# Patient Record
Sex: MALE | Race: WHITE | NOT HISPANIC OR LATINO | Employment: OTHER | URBAN - METROPOLITAN AREA
[De-identification: names, ages, dates, MRNs, and addresses within clinical notes are randomized per-mention and may not be internally consistent; named-entity substitution may affect disease eponyms.]

---

## 2017-04-12 ENCOUNTER — LAB VISIT (OUTPATIENT)
Dept: LAB | Facility: HOSPITAL | Age: 72
End: 2017-04-12
Attending: UROLOGY
Payer: MEDICARE

## 2017-04-12 ENCOUNTER — OFFICE VISIT (OUTPATIENT)
Dept: UROLOGY | Facility: CLINIC | Age: 72
End: 2017-04-12
Payer: MEDICARE

## 2017-04-12 VITALS — WEIGHT: 172 LBS | DIASTOLIC BLOOD PRESSURE: 74 MMHG | SYSTOLIC BLOOD PRESSURE: 134 MMHG | BODY MASS INDEX: 26.94 KG/M2

## 2017-04-12 DIAGNOSIS — Z12.5 PROSTATE CANCER SCREENING: Primary | ICD-10-CM

## 2017-04-12 DIAGNOSIS — Z12.5 PROSTATE CANCER SCREENING: ICD-10-CM

## 2017-04-12 LAB
BILIRUB SERPL-MCNC: NORMAL MG/DL
BLOOD URINE, POC: NORMAL
COLOR, POC UA: NORMAL
COMPLEXED PSA SERPL-MCNC: 9.3 NG/ML
GLUCOSE UR QL STRIP: NORMAL
KETONES UR QL STRIP: NORMAL
LEUKOCYTE ESTERASE URINE, POC: NORMAL
NITRITE, POC UA: NORMAL
PH, POC UA: 6
PROTEIN, POC: NORMAL
SPECIFIC GRAVITY, POC UA: 1.01
UROBILINOGEN, POC UA: NORMAL

## 2017-04-12 PROCEDURE — 84153 ASSAY OF PSA TOTAL: CPT

## 2017-04-12 PROCEDURE — 99214 OFFICE O/P EST MOD 30 MIN: CPT | Mod: S$PBB,,, | Performed by: UROLOGY

## 2017-04-12 PROCEDURE — 36415 COLL VENOUS BLD VENIPUNCTURE: CPT

## 2017-04-12 PROCEDURE — 99999 PR PBB SHADOW E&M-EST. PATIENT-LVL II: CPT | Mod: PBBFAC,,, | Performed by: UROLOGY

## 2017-04-12 NOTE — MR AVS SNAPSHOT
[facility]  [address]  [place] [address]  Phone: [phone]  Fax: [phone]                  Seamus Benitez   2017 8:00 AM   Office Visit    Description:  Male : 1945   Provider:  Renan Antony IV, MD   [facility]  [facility]           Diagnoses this Visit        Comments    Prostate cancer screening    -  Primary            To Do List           Future Appointments        Provider Department Dept Phone    2017 9:00 AM LAB, SAME DAY [facility] [phone]    2018 9:30 AM LABORATORY, [facility] [facility] [phone]    2018 9:00 AM Renan Antony IV, MD [facility] [phone]      Goals (5 Years of Data)     None      Follow-Up and Disposition     Return in about 1 year (around 2018).    Follow-up and Disposition History      [facility]     [facility] -  Assistance  Unless otherwise directed by your provider, please contact [facility], our nurse care line that is available for  assistance.     Registered nurses in the [facility] provide: appointment scheduling, clinical advisement, health education, and other advisory services.  Call: [phone] (toll free)               Medications                Verify that the below list of medications is an accurate representation of the medications you are currently taking.  If none reported, the list may be blank. If incorrect, please contact your healthcare provider. Carry this list with you in case of emergency.                Clinical Reference Information           Your Vitals Were     BP Weight BMI          134/74 (BP Location: Left arm, Patient Position: Sitting, BP Method: Automatic) 78 kg (172 lb) 26.94 kg/m2        Blood Pressure          Most Recent Value    BP  134/74      Allergies as of 2017     No Known Allergies      Immunizations Administered on Date of Encounter  - 4/12/2017     None      Orders Placed During Today's Visit      Normal Orders This Visit    POCT urine dipstick without microscope     Future Labs/Procedures Expected by Expires    PSA, Screening  4/12/2018 6/11/2018    PSA, Screening  4/12/2018 6/11/2018 4/12/2017  8:32 AM - Venice Orozco LPN      Component Results     Component    Color, UA    yellow, clear    Spec Grav UA    1.015    pH, UA    6    WBC, UA    neg    Nitrite, UA    neg    Protein    neg    Glucose, UA    neg    Ketones, UA    neg    Urobilinogen, UA    neg    Bilirubin    neg    Blood, UA    neg            Language Assistance Services     ATTENTION: Language assistance services are available, free of charge. Please call [phone].      ATENCIÓN: Si habla anushka, tiene a mendez disposición servicios gratuitos de asistencia lingüística. Llame al [phone].     CHÚ Ý: N?u b?n nói Ti?ng Vi?t, có các d?ch v? h? tr? ngôn ng? mi?n phí dành cho b?n. G?i s? [phone].         [facility] complies with applicable Federal civil rights laws and does not discriminate on the basis of race, color, national origin, age, disability, or sex.

## 2017-04-12 NOTE — PROGRESS NOTES
Chief Complaint: Elevated PSA    HPI:   4/12/17: No problems at all.  4/11/16: No PSA done will order today.  Doing well.  Says that for the last four months when he drinks a bunch of beer he gets bilateral flank pain. No hematuria.  3/4/15: PSA essentially unchanged.  Oxybutinin and caffeine cessation have improved OAB but not even needing the oxybutinin lately.  10/10/14: Pt states that after a couple of beers his testicles start to get a dull pain and he also has some SP pressure before voiding.  Then he voids and it is relieved.  Fine during the day.  Really a problem when he drinks too much.  Good stream.  Doesn't hold too long.  Drinks coffee in AM, coke or two during the day, VO/coke,   9/3/14: Pt doing well from cyst excision.  PSA 8.4 on recheck after 14d cipro for PSA 10.  Left testicle the same.  7/9/14: 69 yo man has a dull left testicular pain when he goes to bed but not during most of the day.  Keeps him from sleeping a little bit.  No hematuria.  No urolithiasis history.  Saw Dr. Haskins and another urologist for BPH and elevated PSA.  Had two negative prostate biopsies last 4 years ago and he was advised to have a third and he declined.  No urinary bother.  No constipation.  No hernias or related surgeries.    Allergies:  Review of patient's allergies indicates no known allergies.    Medications:  currently has no medications in their medication list.    Review of Systems:  General: No fever, chills, fatigability, or weight loss.  Skin: No rashes, itching, or changes in color or texture of skin.  Chest: Denies AQUINO, cyanosis, wheezing, cough, and sputum production.  Abdomen: Appetite fine. No weight loss. Denies diarrhea, abdominal pain, hematemesis, or blood in stool.  Musculoskeletal: No joint stiffness or swelling. Some back pain.  : As above.  All other review of systems negative.    PMH:   has a past medical history of Elevated PSA.    PSH:   has a past surgical history that includes Appendectomy  and left foot surgery.    FamHx: family history includes Cancer in his father.    SocHx:  reports that he has quit smoking. He does not have any smokeless tobacco history on file. He reports that he drinks alcohol. He reports that he does not use illicit drugs.      Physical Exam:  Vitals:    04/12/17 0814   BP: 134/74     General: A&Ox3, no apparent distress, no deformities  Neck: No masses, normal thyroid  Lungs: normal inspiration, no use of accessory muscles  Heart: normal pulse, no arrhythmias  Abdomen: Soft, NT, ND  Skin: The skin is warm and dry. No jaundice.  Ext: No c/c/e.  :   4/12/17: Test desc priya, no abnormalities of epididymus, nontender. Penis normal, with normal penile and scrotal skin. Meatus normal. Normal rectal tone, no hemorrhoids. Prost >50 gm no nodules or masses appreciated. SV not palpable. Perineum and anus normal.      Labs/Studies:   PSA    7/29/14: 8.4    3/3/15: 9.2    4/16: 10.2  Bladder Scan performed in office:     10/14: PVR 30 ml.    Impression/Plan:   1. Doing really well, PSA now and in 12 mo with RTC 12 mo.

## 2018-02-27 ENCOUNTER — OFFICE VISIT (OUTPATIENT)
Dept: INTERNAL MEDICINE | Facility: CLINIC | Age: 73
End: 2018-02-27
Payer: MEDICARE

## 2018-02-27 VITALS
WEIGHT: 178.81 LBS | BODY MASS INDEX: 28.07 KG/M2 | DIASTOLIC BLOOD PRESSURE: 76 MMHG | TEMPERATURE: 98 F | SYSTOLIC BLOOD PRESSURE: 132 MMHG | OXYGEN SATURATION: 98 % | HEIGHT: 67 IN | HEART RATE: 70 BPM

## 2018-02-27 DIAGNOSIS — L98.9 SKIN LESION: Primary | ICD-10-CM

## 2018-02-27 PROCEDURE — 1159F MED LIST DOCD IN RCRD: CPT | Mod: S$GLB,,, | Performed by: FAMILY MEDICINE

## 2018-02-27 PROCEDURE — 3008F BODY MASS INDEX DOCD: CPT | Mod: S$GLB,,, | Performed by: FAMILY MEDICINE

## 2018-02-27 PROCEDURE — 99202 OFFICE O/P NEW SF 15 MIN: CPT | Mod: S$GLB,,, | Performed by: FAMILY MEDICINE

## 2018-02-27 PROCEDURE — 99999 PR PBB SHADOW E&M-EST. PATIENT-LVL IV: CPT | Mod: PBBFAC,,, | Performed by: FAMILY MEDICINE

## 2018-02-27 PROCEDURE — 1126F AMNT PAIN NOTED NONE PRSNT: CPT | Mod: S$GLB,,, | Performed by: FAMILY MEDICINE

## 2018-02-27 RX ORDER — HALOBETASOL PROPIONATE 0.5 MG/G
CREAM TOPICAL
COMMUNITY
Start: 2018-02-01 | End: 2019-01-02

## 2018-02-27 NOTE — PROGRESS NOTES
"HEALTH MAINTENANCE REVIEW  Health Maintenance   Topic Date Due    Hepatitis C Screening  1945    Lipid Panel  1945    TETANUS VACCINE  07/11/1964    Colonoscopy  07/11/1996    Zoster Vaccine  07/11/2006    Pneumococcal (65+) (1 of 2 - PCV13) 07/11/2011    Abdominal Aortic Aneurysm Screening  07/11/2011    Influenza Vaccine  08/01/2017        HEALTH MAINTENANCE INTERVENTIONS - DUE OR DUE SOON  Health Maintenance Due   Topic Date Due    Hepatitis C Screening  1945    Lipid Panel  1945    TETANUS VACCINE  07/11/1964    Colonoscopy  07/11/1996    Zoster Vaccine  07/11/2006    Pneumococcal (65+) (1 of 2 - PCV13) 07/11/2011    Abdominal Aortic Aneurysm Screening  07/11/2011    Influenza Vaccine  08/01/2017       FUTURE APPOINTMENTS  Future Appointments  Date Time Provider Department Center   4/12/2018 9:30 AM LABORATORY, [facility]   4/18/2018 9:00 AM Renan Antony IV, MD ON UROLOGY  Medical    5/10/2018 8:30 AM Hiral James MD [facility] [place]       CHIEF COMPLAINT  Recurrent Skin Infections      HISTORY OF PRESENT ILLNESS    Problem List Items Addressed This Visit     Skin lesion - Primary    Overview     ??bullous pemphigus??         Current Assessment & Plan     This problem is NEW TO ME. This problem REQUIRES FURTHER WORK-UP. ONSET was 3 to 4 weeks ago. QUALITY described as painful tender area. SEVERITY described as MODERATELY SEVERE at worst, MILD at present. EXACERBATING FACTORS included sitting on the area. LOCATION is medial aspect of right buttock. TIMING of symptoms described as having gotten much better. He initially described this area to me as a "boil." However, on exam, it does not appear consistent with a carbuncle/your uncle. At the area in question (the medial aspect of the right buttock), there is a roughly 1 x 1.5 area of MILDLY erythematous tissue with MINIMAL (if any) induration. There is no hyperthermia, fluctuance, drainage, or " "tenderness. He went on to provide additional history that he has "eczema", treated with halobetasol cream, prescribed by dermatologist, and these lesions to occur within areas of the eczema. Their description of the lesions is atypical for boils, but doesn't sound exactly like bullous lesions, either. I discussed differential diagnosis with him. I explained that I was uncertain of the cause of this lesion. It was agreed to treat it empirically with the prescription halobetasol 0.05% cream while awaiting dermatology consultation for second opinion. In the meantime, I instructed him to take photos of any new lesions that develop.          Relevant Orders    Ambulatory referral to Dermatology          REVIEW OF SYSTEMS  CONSTITUTIONAL: No fever or chills reported.   CARDIOVASCULAR: No angina or orthopnea reported.   PULMONARY: No hemoptysis or trouble breathing reported.   HEMATOLOGIC: No bleeding problems reported.     PHYSICAL EXAM  Vitals:    02/27/18 0938   BP: 132/76   BP Location: Right arm   Patient Position: Sitting   BP Method: Medium (Manual)   Pulse: 70   Temp: 97.7 °F (36.5 °C)   TempSrc: Tympanic   SpO2: 98%   Weight: 81.1 kg (178 lb 12.7 oz)   Height: 5' 7" (1.702 m)     CONSTITUTIONAL: Vital signs noted. No apparent distress. Does not appear acutely ill or septic. Appears adequately hydrated.  HEENT: External ENT grossly unremarkable. Hearing grossly intact. Oropharynx moist.  PULM: Lungs clear. Breathing unlabored.  HEART: Auscultation reveals regular rate and rhythm without murmur, gallop or rub.  DERM: Skin warm and moist with normal turgor. Description of exam of this system is further documented above in HPI.   NEURO: There are no gross focal motor deficits or gross deficits of cranial nerves III-XII.  PSYCHIATRIC: Alert and oriented x 3. Mood is grossly neutral. Affect appropriate. Judgment and insight not grossly compromised.  MUSCULOSKELETAL: Grossly normal stance and gait.     PAST " "MEDICAL HISTORY, FAMILY HISTORY, SOCIAL HISTORY, CURRENT MEDICATION LIST, and ALLERGY LIST reviewed by me (JOAN Mathews MD) and are updated consistent with the patient's report.    ASSESSMENT and PLAN  Skin lesion  -     Ambulatory referral to Dermatology        PRESCRIPTION MEDICATION MANAGEMENT  Medication List with Changes/Refills   Current Medications    HALOBETASOL (ULTRAVATE) 0.05 % CREAM           Follow-up for any worsening or failure to improve, any new complaints or concerns, wellness and preventive services.    ABOUT THIS DOCUMENTATION:  · The order of the conditions listed in the HPI is one of convenience and does not necessarily reflect the chronology of the appointment, nor the relative importance of a condition. It is possible that additional description or status details about condition(s) may be found elsewhere in the EHR documentation for today's encounter.  · Documentation entered by me for this encounter was done in part using speech-recognition technology. Although I have made an effort to ensure accuracy, "sound like" errors may exist and should be interpreted in context.                        -JOAN Mathews MD    Patient Instructions     I'm NOT telling you that you for sure have bullous pemphigoid, but it is what I THINK you have. Your dermatologist will be able to tell you more.      Understanding Bullous Pemphigoid    Bullous pemphigoid is a long-term (chronic) skin disease. It mostly affects people ages 60 and older. It causes large blisters to form. These blisters may be on one part of the body or all over. They often appear on the arms, legs, groin, chest, and stomach. Sores may sometimes occur in the mouth, too.  How to say it  BULL-us [id number]   What causes bullous pemphigoid?  Bullous pemphigoid is an autoimmune disease. Thats when the immune system attacks healthy parts of the body. In this case, it attacks the proteins that join the skin cells to one another. The " disease may also be caused by some medicines, such as penicillin.  Symptoms of bullous pemphigoid  Bullous pemphigoid may first look like hives or eczema, because it can be red and itchy. Large blisters may then form. These blisters are often filled with a clear liquid. They may feel very itchy. The blisters may pop. But they usually dont leave any scars. They may go away and come back again.  Treatment for bullous pemphigoid  The blisters from this skin disease may go away and come back. So treatment is often needed for a few years. Treatment options include:  · Skin care. Using mild soap and anti-itch creams may help with symptoms.  · Steroids. Steroids are the main treatment option for this disease. They may be creams or ointments that you put on your skin. Or they may be in pill or liquid form (oral). They can ease itching and stop new blisters from forming. But oral steroids may have side effects.  · Other medicines. If you cant take a steroid, other medicines may work. Some of these treat the disease by slowing down the immune system.  Possible complications of bullous pemphigoid  ·  Skin infection  When to call your healthcare provider  Call your healthcare provider right away if you have any of these:  · Fever of 100.4°F (38°C) or higher, or as directed  · Pain that gets worse  · Symptoms that dont get better, or get worse  · New symptoms   Date Last Reviewed: 5/1/2016  © 8434-3059 [employer]. [address] [place] [address], [place] [address]. All rights reserved. This information is not intended as a substitute for professional medical care. Always follow your healthcare professional's instructions.    Prevention Guidelines, Men Ages 65 and Older  Screening tests and vaccines are an important part of managing your health. Health counseling is essential, too. Below are guidelines for these, for men ages 65 and older. Talk with your healthcare provider to make sure youre up-to-date on what you  need.  Screening Who needs it How often   Abdominal aortic aneurysm Men ages 65 to 75 who have ever smoked 1 ultrasound   Alcohol misuse All men in this age group At routine exams   Blood pressure All men in this age group Every 2 years if your blood pressure is less than 120/80 mm Hg; yearly if your systolic blood pressure is 120 to 139 mm Hg, or your diastolic blood pressure reading is 80 to 89 mm Hg   Colorectal cancer All men in this age group Flexible sigmoidoscopy every 5 years, or colonoscopy every 10 years, or double-contrast barium enema every 5 years; yearly fecal occult blood test or fecal immunochemical test; or a stool DNA test as often as your healthcare provider advises; talk with your healthcare provider about which tests are best for you and when you no longer need colonoscopies (generally after age 75)   Depression All men in this age group At routine exams   Type 2 diabetes or prediabetes All adults beginning at age 45 and adults without symptoms at any age who are overweight or obese and have 1 or more other risk factors for diabetes At least every 3 years (yearly if your blood sugar has already begun to rise)   Hepatitis C Men at increased risk for infection - talk with your healthcare provider At routine exams   High cholesterol or triglycerides All men in this age group At least every 5 years   HIV Men at increased risk for infection - talk with your healthcare provider At routine exams   Lung cancer Adults ages 55 to 80 who have smoked Yearly screening in smokers with 30 pack-year history of smoking or who quit within 15 years   Obesity All men in this age group At routine exams   Prostate cancer All men in this age group, talk to healthcare provider about risks and benefits of digital rectal exam (EDUARDO) and prostate-specific antigen (PSA) screening1 At routine exams   Syphilis Men at increased risk for infection - talk with your healthcare provider At routine exams   Tuberculosis Men at  increased risk for infection - talk with your healthcare provider Ask your healthcare provider   Vision All men in this age group Every 1 to 2 years; if you have a chronic health condition, ask your healthcare provider if you needs exams more often   Vaccine Who needs it How often   Chickenpox (varicella) All men in this age group who have no record of this infection or vaccine 2 doses; second dose should be given at least 4 weeks after the first dose   Hepatitis A Men at increased risk for infection - talk with your healthcare provider 2 doses given at least 6 months apart   Hepatitis B Men at increased risk for infection - talk with your healthcare provider 3 doses over 6 months; second dose should be given 1 month after the first dose; the third dose should be given at least 2 months after the second dose and at least 4 months after the first dose   Haemophilus influenzae Type B (HIB) Men at increased risk for infection - talk with your healthcare provider 1 to 3 doses   Influenza (flu) All men in this age group  Once a year   Meningococcal Men at increased risk for infection - talk with your healthcare provider 1 or more doses   Pneumococcal conjugate vaccine (PCV13) and pneumococcal polysaccharide vaccine (PPSV23) All men in this age group 1 dose of each vaccine   Tetanus/diphtheria/  pertussis (Td/Tdap) booster All men in this age group Td every 10 years, or Tdap if you will have contact with a child younger than 12 months old   Zoster All men in this age group 1 dose   Counseling Who needs it How often   Diet and exercise Men who are overweight or obese When diagnosed, and then at routine exams   Fall prevention (exercise, vitamin D supplements) All men in this age group At routine exams   Sexually transmitted infection Men at increased risk for infection - talk with your healthcare provider At routine exams   Use of daily aspirin Men ages 45 to 79 at risk for cardiovascular health problems At routine exams    Use of tobacco and the health effects it can cause All men in this age group Every visit   [address] [place] [address] [facility]   Date Last Reviewed: 2/1/2017  © 5204-2153 [employer]. [address] [place] [address], [place] [address]. All rights reserved. This information is not intended as a substitute for professional medical care. Always follow your healthcare professional's instructions.

## 2018-02-27 NOTE — ASSESSMENT & PLAN NOTE
"This problem is NEW TO ME. This problem REQUIRES FURTHER WORK-UP. ONSET was 3 to 4 weeks ago. QUALITY described as painful tender area. SEVERITY described as MODERATELY SEVERE at worst, MILD at present. EXACERBATING FACTORS included sitting on the area. LOCATION is medial aspect of right buttock. TIMING of symptoms described as having gotten much better. He initially described this area to me as a "boil." However, on exam, it does not appear consistent with a carbuncle/your uncle. At the area in question (the medial aspect of the right buttock), there is a roughly 1 x 1.5 area of MILDLY erythematous tissue with MINIMAL (if any) induration. There is no hyperthermia, fluctuance, drainage, or tenderness. He went on to provide additional history that he has "eczema", treated with halobetasol cream, prescribed by dermatologist, and these lesions to occur within areas of the eczema. Their description of the lesions is atypical for boils, but doesn't sound exactly like bullous lesions, either. I discussed differential diagnosis with him. I explained that I was uncertain of the cause of this lesion. It was agreed to treat it empirically with the prescription halobetasol 0.05% cream while awaiting dermatology consultation for second opinion. In the meantime, I instructed him to take photos of any new lesions that develop.   "

## 2018-02-27 NOTE — PATIENT INSTRUCTIONS
I'm NOT telling you that you for sure have bullous pemphigoid, but it is what I THINK you have. Your dermatologist will be able to tell you more.      Understanding Bullous Pemphigoid    Bullous pemphigoid is a long-term (chronic) skin disease. It mostly affects people ages 60 and older. It causes large blisters to form. These blisters may be on one part of the body or all over. They often appear on the arms, legs, groin, chest, and stomach. Sores may sometimes occur in the mouth, too.  How to say it  BULL-us [id number]   What causes bullous pemphigoid?  Bullous pemphigoid is an autoimmune disease. Thats when the immune system attacks healthy parts of the body. In this case, it attacks the proteins that join the skin cells to one another. The disease may also be caused by some medicines, such as penicillin.  Symptoms of bullous pemphigoid  Bullous pemphigoid may first look like hives or eczema, because it can be red and itchy. Large blisters may then form. These blisters are often filled with a clear liquid. They may feel very itchy. The blisters may pop. But they usually dont leave any scars. They may go away and come back again.  Treatment for bullous pemphigoid  The blisters from this skin disease may go away and come back. So treatment is often needed for a few years. Treatment options include:  · Skin care. Using mild soap and anti-itch creams may help with symptoms.  · Steroids. Steroids are the main treatment option for this disease. They may be creams or ointments that you put on your skin. Or they may be in pill or liquid form (oral). They can ease itching and stop new blisters from forming. But oral steroids may have side effects.  · Other medicines. If you cant take a steroid, other medicines may work. Some of these treat the disease by slowing down the immune system.  Possible complications of bullous pemphigoid  ·  Skin infection  When to call your healthcare provider  Call your healthcare provider  right away if you have any of these:  · Fever of 100.4°F (38°C) or higher, or as directed  · Pain that gets worse  · Symptoms that dont get better, or get worse  · New symptoms   Date Last Reviewed: 5/1/2016  © 5223-9147 [employer] [address] [place] [address]. All rights reserved. This information is not intended as a substitute for professional medical care. Always follow your healthcare professional's instructions.    Prevention Guidelines, Men Ages 65 and Older  Screening tests and vaccines are an important part of managing your health. Health counseling is essential, too. Below are guidelines for these, for men ages 65 and older. Talk with your healthcare provider to make sure youre up-to-date on what you need.  Screening Who needs it How often   Abdominal aortic aneurysm Men ages 65 to 75 who have ever smoked 1 ultrasound   Alcohol misuse All men in this age group At routine exams   Blood pressure All men in this age group Every 2 years if your blood pressure is less than 120/80 mm Hg; yearly if your systolic blood pressure is 120 to 139 mm Hg, or your diastolic blood pressure reading is 80 to 89 mm Hg   Colorectal cancer All men in this age group Flexible sigmoidoscopy every 5 years, or colonoscopy every 10 years, or double-contrast barium enema every 5 years; yearly fecal occult blood test or fecal immunochemical test; or a stool DNA test as often as your healthcare provider advises; talk with your healthcare provider about which tests are best for you and when you no longer need colonoscopies (generally after age 75)   Depression All men in this age group At routine exams   Type 2 diabetes or prediabetes All adults beginning at age 45 and adults without symptoms at any age who are overweight or obese and have 1 or more other risk factors for diabetes At least every 3 years (yearly if your blood sugar has already begun to rise)   Hepatitis C Men at increased risk for infection  - talk with your healthcare provider At routine exams   High cholesterol or triglycerides All men in this age group At least every 5 years   HIV Men at increased risk for infection - talk with your healthcare provider At routine exams   Lung cancer Adults ages 55 to 80 who have smoked Yearly screening in smokers with 30 pack-year history of smoking or who quit within 15 years   Obesity All men in this age group At routine exams   Prostate cancer All men in this age group, talk to healthcare provider about risks and benefits of digital rectal exam (EDUARDO) and prostate-specific antigen (PSA) screening1 At routine exams   Syphilis Men at increased risk for infection - talk with your healthcare provider At routine exams   Tuberculosis Men at increased risk for infection - talk with your healthcare provider Ask your healthcare provider   Vision All men in this age group Every 1 to 2 years; if you have a chronic health condition, ask your healthcare provider if you needs exams more often   Vaccine Who needs it How often   Chickenpox (varicella) All men in this age group who have no record of this infection or vaccine 2 doses; second dose should be given at least 4 weeks after the first dose   Hepatitis A Men at increased risk for infection - talk with your healthcare provider 2 doses given at least 6 months apart   Hepatitis B Men at increased risk for infection - talk with your healthcare provider 3 doses over 6 months; second dose should be given 1 month after the first dose; the third dose should be given at least 2 months after the second dose and at least 4 months after the first dose   Haemophilus influenzae Type B (HIB) Men at increased risk for infection - talk with your healthcare provider 1 to 3 doses   Influenza (flu) All men in this age group  Once a year   Meningococcal Men at increased risk for infection - talk with your healthcare provider 1 or more doses   Pneumococcal conjugate vaccine (PCV13) and  pneumococcal polysaccharide vaccine (PPSV23) All men in this age group 1 dose of each vaccine   Tetanus/diphtheria/  pertussis (Td/Tdap) booster All men in this age group Td every 10 years, or Tdap if you will have contact with a child younger than 12 months old   Zoster All men in this age group 1 dose   Counseling Who needs it How often   Diet and exercise Men who are overweight or obese When diagnosed, and then at routine exams   Fall prevention (exercise, vitamin D supplements) All men in this age group At routine exams   Sexually transmitted infection Men at increased risk for infection - talk with your healthcare provider At routine exams   Use of daily aspirin Men ages 45 to 79 at risk for cardiovascular health problems At routine exams   Use of tobacco and the health effects it can cause All men in this age group Every visit   [address] [place] [address] [facility]   Date Last Reviewed: 2/1/2017  © 1588-7553 [employer], [employer]. [address] [place] [address]. All rights reserved. This information is not intended as a substitute for professional medical care. Always follow your healthcare professional's instructions.

## 2018-07-26 ENCOUNTER — INITIAL CONSULT (OUTPATIENT)
Dept: DERMATOLOGY | Facility: CLINIC | Age: 73
End: 2018-07-26
Payer: MEDICARE

## 2018-07-26 DIAGNOSIS — L57.0 ACTINIC KERATOSES: ICD-10-CM

## 2018-07-26 DIAGNOSIS — D48.5 NEOPLASM OF UNCERTAIN BEHAVIOR OF SKIN: ICD-10-CM

## 2018-07-26 DIAGNOSIS — L28.0 LICHEN SIMPLEX CHRONICUS: ICD-10-CM

## 2018-07-26 DIAGNOSIS — L98.9 ECZEMATOUS SKIN LESIONS: ICD-10-CM

## 2018-07-26 PROCEDURE — 17000 DESTRUCT PREMALG LESION: CPT | Mod: S$GLB,,, | Performed by: DERMATOLOGY

## 2018-07-26 PROCEDURE — 99203 OFFICE O/P NEW LOW 30 MIN: CPT | Mod: 25,S$GLB,, | Performed by: DERMATOLOGY

## 2018-07-26 PROCEDURE — 17003 DESTRUCT PREMALG LES 2-14: CPT | Mod: S$GLB,,, | Performed by: DERMATOLOGY

## 2018-07-26 PROCEDURE — 88305 TISSUE EXAM BY PATHOLOGIST: CPT | Mod: 26,,, | Performed by: PATHOLOGY

## 2018-07-26 PROCEDURE — 11100 PR BIOPSY OF SKIN LESION: CPT | Mod: 59,S$GLB,, | Performed by: DERMATOLOGY

## 2018-07-26 PROCEDURE — 88305 TISSUE EXAM BY PATHOLOGIST: CPT | Performed by: PATHOLOGY

## 2018-07-26 PROCEDURE — 99999 PR PBB SHADOW E&M-EST. PATIENT-LVL II: CPT | Mod: PBBFAC,,, | Performed by: DERMATOLOGY

## 2018-07-26 RX ORDER — HALOBETASOL PROPIONATE 0.5 MG/G
CREAM TOPICAL
Qty: 50 G | Refills: 3 | Status: SHIPPED | OUTPATIENT
Start: 2018-07-26 | End: 2019-01-02

## 2018-07-26 NOTE — PATIENT INSTRUCTIONS
Shave Biopsy Wound Care    Your doctor has performed a shave biopsy today.  A band aid and vaseline ointment has been placed over the site.  This should remain in place for 24 hours.  It is recommended that you keep the area dry for the first 24 hours.  After 24 hours, you may remove the band aid and wash the area with warm soap and water and apply Vaseline jelly.  Many patients prefer to use Neosporin or Bacitracin ointment.  This is acceptable; however, know that you can develop an allergy to this medication even if you have used it safely for years.  It is important to keep the area moist.  Letting it dry out and get air slows healing time, and will worsen the scar.  Band aid is optional after first 24 hours.      If you notice increasing redness, tenderness, pain, or yellow drainage at the biopsy site, please notify your doctor.  These are signs of an infection.    If your biopsy site is bleeding, apply firm pressure for 15 minutes straight.  Repeat for another 15 minutes, if it is still bleeding.   If the surgical site continues to bleed, then please contact your doctor.      [facility]   DERMATOLOGY  [address] [facility] [place] [address]   Dept: [phone]   Dept Fax: [phone]       XEROSIS (DRY SKIN)      1. Definition    Xerosis is the term for dry skin.  We all have a natural oil coating over our skin produced by the skin oil glands.  If this oil is removed, the skin becomes dry which can lead to cracking, which can lead to inflammation.  Xerosis is usually a long-term problem that recurs often, especially in the winter.    2. Cause     Long hot baths or showers can remove our natural oil and lead to xerosis.  One should never take more than one bath or shower a day and for no longer than ten minutes.   Use of harsh soaps such as Zest, Dial, Ghanaian Spring, Lever and Ivory can worsen and cause xerosis.   Cold winter weather worsens xerosis because the  amount of moisture contained in cold air is much less than the amount of moisture in warm air.    3. Treatment     Treatment is intended to restore the natural oil to your skin.  Keep the skin lubricated.     Do not take more than one bath or shower a day.  Use lukewarm water, not hot.  Hot water dries out the skin.     Use a gentle moisturizing soap such as Cetaphil soap, Dove, or Cetaphil Restoraderm cleanser.     When toweling dry, dont rub.  Blot the skin so there is still some water left on the skin.  You should apply a moisturizing cream to all of the skin such as Cerave cream, Cetaphil cream, Restoraderm or Eucerin Original Formula cream.   Alpha hydroxyacid lotions, i.e., AmLactin, also work very well for preventing dry skin, but may burn when used on inflamed or reddened skin.      [facility]   DERMATOLOGY  [address] [facility]   [place] [address]    Dept: [phone]   Dept Fax: [phone]      Preventing Skin Cancer  Relaxing in the sun may feel good. But it isnt good for your skin. In fact, being exposed to the suns harmful rays is a major cause of skin cancer. This is a serious disease that can be life-threatening. People of all ages and backgrounds are at risk. But in most cases, skin cancer can be prevented.    Your Role in Prevention  You can act today to help prevent skin cancer. Start by avoiding the suns UV (ultraviolet) rays. And dont use tanning beds, which are no safer than the sun. Taking these steps can help keep you from getting skin cancer. It can also help prevent wrinkles and other sun-induced aging effects. Make sure your children also follow these safeguards. Now is the time to start taking preventive steps against skin cancer.  When You Are Outdoors  Protect your skin when you go outdoors during the day. Take precautions whenever you go out to eat, run errands by car or on foot, or do any outdoor activity. There isnt just one easy way to protect your  skin. Its best to follow all of these steps:  · Wear tightly woven clothing that covers your skin. Put on a wide-brimmed hat to protect your face, ears, and scalp.  · Watch the clock. Try to avoid the sun between 10 a.m. and 4 p.m., when it is strongest.  · Head for the shade or create your own. Use an umbrella when sitting or strolling.  · Know that the suns rays can reflect off sand, water, and snow. This can harm your skin. Take extra care when you are near reflective surfaces.  · Keep in mind that even when the weather is hazy or cloudy, your skin can be exposed to strong UV rays.  · Shield your skin with sunscreen. Also, apply sunscreen to your childrens skin.  Tips for Using Sunscreen  To help prevent skin cancer, choose the right sunscreen and use it correctly. Try the following tips:  · Choose a sunscreen that has a sun protection factor (SPF) of at least 15. For the best protection, an SPF of at least 30 is preferred. Also, choose a sunscreen labeled broad spectrum. This will shield you from both UVA and UVB (ultraviolet A and B) rays.  · If one brand irritates your skin, try another, particularly ones without fragrance.  · Use a water-resistant sunscreen if swimming or sweating.  · Reapply sunscreen every 2 hours. If youre active, do this more often.  · Cover any sun-exposed skin, from your face to your feet. Dont forget your ears and your lips.  · Know that while sunscreen helps protect you, it isnt enough. You should also wear protective clothing. And try to stay out of the sun as much as you can, especially from 10 a.m. to 4 p.m.  © 3161-2681 [employer]. [address] [place] [address], [place] [address]. All rights reserved. This information is not intended as a substitute for professional medical care. Always follow your healthcare professional's instructions.

## 2018-07-26 NOTE — PROGRESS NOTES
Subjective:       Patient ID:  Seamus Benitez is a 73 y.o. male who presents for   Chief Complaint   Patient presents with    Spot     c/o spots to bilateral lower legs x several yrs,,,a little sore,,itchy,,no tx     Possibly skin CA hx of the left hand    History of Present Illness: The patient presents with chief complaint of lesions.  Location: right lower leg  Duration: several years  Signs/Symptoms: growing    Prior treatments: none              Review of Systems   Constitutional: Negative for fever and chills.   Gastrointestinal: Negative for nausea and vomiting.   Skin: Negative for daily sunscreen use, activity-related sunscreen use and recent sunburn.   Hematologic/Lymphatic: Does not bruise/bleed easily.        Objective:    Physical Exam   Constitutional: He appears well-developed and well-nourished. No distress.   Neurological: He is alert and oriented to person, place, and time. He is not disoriented.   Psychiatric: He has a normal mood and affect.   Skin:   Areas Examined (abnormalities noted in diagram):   Scalp / Hair Palpated and Inspected  Head / Face Inspection Performed  Neck Inspection Performed  Chest / Axilla Inspection Performed  Abdomen Inspection Performed  Back Inspection Performed  RUE Inspected  LUE Inspection Performed  RLE Inspected  LLE Inspection Performed  Nails and Digits Inspection Performed                   Diagram Legend     Erythematous scaling macule/papule c/w actinic keratosis       Vascular papule c/w angioma      Pigmented verrucoid papule/plaque c/w seborrheic keratosis      Yellow umbilicated papule c/w sebaceous hyperplasia      Irregularly shaped tan macule c/w lentigo     1-2 mm smooth white papules consistent with Milia      Movable subcutaneous cyst with punctum c/w epidermal inclusion cyst      Subcutaneous movable cyst c/w pilar cyst      Firm pink to brown papule c/w dermatofibroma      Pedunculated fleshy papule(s) c/w skin tag(s)      Evenly pigmented  macule c/w junctional nevus     Mildly variegated pigmented, slightly irregular-bordered macule c/w mildly atypical nevus      Flesh colored to evenly pigmented papule c/w intradermal nevus       Pink pearly papule/plaque c/w basal cell carcinoma      Erythematous hyperkeratotic cursted plaque c/w SCC      Surgical scar with no sign of skin cancer recurrence      Open and closed comedones      Inflammatory papules and pustules      Verrucoid papule consistent consistent with wart     Erythematous eczematous patches and plaques     Dystrophic onycholytic nail with subungual debris c/w onychomycosis     Umbilicated papule    Erythematous-base heme-crusted tan verrucoid plaque consistent with inflamed seborrheic keratosis     Erythematous Silvery Scaling Plaque c/w Psoriasis     See annotation                Assessment / Plan:      Pathology Orders:     Normal Orders This Visit    Tissue Specimen To Pathology, Dermatology     Questions:    Directional Terms:  Other(comment)    Clinical information:  SCC    Specific Site:  right shin        Lichen simplex chronicus  Eczematous skin lesions  -     halobetasol (ULTRAVATE) 0.05 % cream; AAA bid for eczema. Do not use in biopsy site.  Dispense: 50 g; Refill: 3  -     Discussed d/c picking areas of legs, will start above med.     Actinic keratoses  Cryosurgery Procedure Note    The patient is informed of the precancerous quality and need for treatment of these lesions. After risks, benefits and alternatives explained, including blistering, pain, hyper- and hypopigmentation, patient verbally consents to cryotherapy to precancerous lesions. Liquid nitrogen cryosurgery is applied to the 8 actinic keratoses, as detailed in the physical exam, to produce a freeze injury. The patient is aware that blisters may form and is instructed on wound care with gentle cleansing and use of vaseline ointment to keep moist until healed. The patient is supplied a handout on cryosurgery and is  instructed to call if lesions do not completely resolve.    Neoplasm of uncertain behavior of skin  -     Tissue Specimen To Pathology, Dermatology  -     Shave biopsy(-ies) done of 1 site(s).   Patient informed to call for results within 2 weeks if have not [facility]           Follow-up for call for results.     PROCEDURE NOTE - SHAVE BIOPSY   Location: right shin    After risk, benefits, and alternatives were discussed with the patient, the patient agrees to the procedure by verbal informed consent.  The area(s) were cleansed with alcohol. 2 cc of lidocaine 1% with epinephrine was injected for local anesthesia into each lesion(s).  A sharp dermablade was used to remove part or all of the lesion(s).  The specimen(s) will be sent for tissue pathology.  Hemostasis was obtained with aluminum chloride and/or hyfrecation.  The area(s) were dressed with vaseline ointment and bandaged.  The patient tolerated the procedure well without adverse events.  Wound care instructions were given to the patient on the AVS.  The patient will be notified of pathology results once available. Results will also be available in Epic.

## 2018-10-08 ENCOUNTER — TELEPHONE (OUTPATIENT)
Dept: DERMATOLOGY | Facility: CLINIC | Age: 73
End: 2018-10-08

## 2018-10-08 NOTE — TELEPHONE ENCOUNTER
----- Message from Deonna Lawton sent at 10/8/2018  9:46 AM CDT -----  Contact: Darling - wife  States the pt has a spot on his leg and wants to be worked in before 11/14, the pt wife can be reached at  [phone]///[id number]

## 2018-12-08 ENCOUNTER — NURSE TRIAGE (OUTPATIENT)
Dept: ADMINISTRATIVE | Facility: CLINIC | Age: 73
End: 2018-12-08

## 2018-12-08 NOTE — TELEPHONE ENCOUNTER
"    Reason for Disposition   [1] Unable to urinate (or only a few drops) > 4 hours AND     [2] bladder feels very full (e.g., palpable bladder or strong urge to urinate)    Answer Assessment - Initial Assessment Questions  1. SYMPTOM: "What's the main symptom you're concerned about?" (e.g., frequency, incontinence)      retention  2. ONSET: "When did the  ________  start?"      Last night  3. PAIN: "Is there any pain?" If so, ask: "How bad is it?" (Scale: 1-10; mild, moderate, severe)      Yes moderate  4. CAUSE: "What do you think is causing the symptoms?"      unsure  5. OTHER SYMPTOMS: "Do you have any other symptoms?" (e.g., fever, flank pain, blood in urine, pain with urination)      onlt small drops of urine  6. PREGNANCY: "Is there any chance you are pregnant?" "When was your last menstrual period?"      n/a    Protocols used: ST URINARY SYMPTOMS-A-      "

## 2018-12-10 ENCOUNTER — TELEPHONE (OUTPATIENT)
Dept: UROLOGY | Facility: CLINIC | Age: 73
End: 2018-12-10

## 2018-12-10 NOTE — TELEPHONE ENCOUNTER
Patient's wife states patient went to ER for urinary retention and was placed on Flomax. She requested sooner appt than 1/15/19. Scheduled next available on 1/2/19.

## 2018-12-10 NOTE — TELEPHONE ENCOUNTER
----- Message from Syl Hall sent at 12/10/2018  8:10 AM CST -----  Contact: Darling [phone]  Pt was in the emergency room for urinary retention over the weekend and needs a sooner appt than 1/15. Please contact wife Darling. This is urgent that he be seen sooner.

## 2018-12-11 NOTE — TELEPHONE ENCOUNTER
TASK: Please read Patient Portal Message (below), and then contact him to verify his receipt and understanding. Thanks.  --------------------------------------------------------------------------------  Seamus Son.    I received a message that over the weekend you were having difficulty urinating and the on-call nurse sent you to emergency department.    I understand that you have an appointment with urology scheduled on January 2, 2019.    If you are still having difficulty urinating, please schedule an appointment to come see me sooner, and I will evaluate you and see what else can be done while we are awaiting urology consultation. If needed, I will see if we can get you a sooner appointment with a different urologist.    Thanks for letting me care for you.    Sincerely,    JOAN Mathews MD

## 2019-01-02 ENCOUNTER — OFFICE VISIT (OUTPATIENT)
Dept: UROLOGY | Facility: CLINIC | Age: 74
End: 2019-01-02
Payer: MEDICARE

## 2019-01-02 ENCOUNTER — LAB VISIT (OUTPATIENT)
Dept: LAB | Facility: HOSPITAL | Age: 74
End: 2019-01-02
Attending: UROLOGY
Payer: MEDICARE

## 2019-01-02 VITALS — WEIGHT: 175 LBS | BODY MASS INDEX: 27.47 KG/M2 | HEIGHT: 67 IN

## 2019-01-02 DIAGNOSIS — Z12.5 PROSTATE CANCER SCREENING: ICD-10-CM

## 2019-01-02 DIAGNOSIS — N40.0 BENIGN PROSTATIC HYPERPLASIA, UNSPECIFIED WHETHER LOWER URINARY TRACT SYMPTOMS PRESENT: Primary | ICD-10-CM

## 2019-01-02 DIAGNOSIS — N40.0 BENIGN PROSTATIC HYPERPLASIA, UNSPECIFIED WHETHER LOWER URINARY TRACT SYMPTOMS PRESENT: ICD-10-CM

## 2019-01-02 LAB
BILIRUB SERPL-MCNC: NORMAL MG/DL
BLOOD URINE, POC: NORMAL
COLOR, POC UA: YELLOW
COMPLEXED PSA SERPL-MCNC: 12.6 NG/ML
GLUCOSE UR QL STRIP: NORMAL
KETONES UR QL STRIP: NORMAL
LEUKOCYTE ESTERASE URINE, POC: NORMAL
NITRITE, POC UA: NORMAL
PH, POC UA: 5
PROTEIN, POC: NORMAL
SPECIFIC GRAVITY, POC UA: 1.02
UROBILINOGEN, POC UA: NORMAL

## 2019-01-02 PROCEDURE — 81002 POCT URINE DIPSTICK WITHOUT MICROSCOPE: ICD-10-PCS | Mod: S$GLB,,, | Performed by: UROLOGY

## 2019-01-02 PROCEDURE — 1101F PT FALLS ASSESS-DOCD LE1/YR: CPT | Mod: CPTII,S$GLB,, | Performed by: UROLOGY

## 2019-01-02 PROCEDURE — 99999 PR PBB SHADOW E&M-EST. PATIENT-LVL II: ICD-10-PCS | Mod: PBBFAC,,, | Performed by: UROLOGY

## 2019-01-02 PROCEDURE — 84153 ASSAY OF PSA TOTAL: CPT

## 2019-01-02 PROCEDURE — 36415 COLL VENOUS BLD VENIPUNCTURE: CPT

## 2019-01-02 PROCEDURE — 99214 PR OFFICE/OUTPT VISIT, EST, LEVL IV, 30-39 MIN: ICD-10-PCS | Mod: 25,S$GLB,, | Performed by: UROLOGY

## 2019-01-02 PROCEDURE — 81002 URINALYSIS NONAUTO W/O SCOPE: CPT | Mod: S$GLB,,, | Performed by: UROLOGY

## 2019-01-02 PROCEDURE — 99214 OFFICE O/P EST MOD 30 MIN: CPT | Mod: 25,S$GLB,, | Performed by: UROLOGY

## 2019-01-02 PROCEDURE — 99999 PR PBB SHADOW E&M-EST. PATIENT-LVL II: CPT | Mod: PBBFAC,,, | Performed by: UROLOGY

## 2019-01-02 PROCEDURE — 1101F PR PT FALLS ASSESS DOC 0-1 FALLS W/OUT INJ PAST YR: ICD-10-PCS | Mod: CPTII,S$GLB,, | Performed by: UROLOGY

## 2019-01-02 RX ORDER — TAMSULOSIN HYDROCHLORIDE 0.4 MG/1
1 CAPSULE ORAL DAILY
Refills: 0 | COMMUNITY
Start: 2018-12-08 | End: 2019-01-02 | Stop reason: SDUPTHER

## 2019-01-02 RX ORDER — FINASTERIDE 5 MG/1
5 TABLET, FILM COATED ORAL DAILY
Qty: 30 TABLET | Refills: 11 | Status: SHIPPED | OUTPATIENT
Start: 2019-01-02 | End: 2019-02-04

## 2019-01-02 RX ORDER — TAMSULOSIN HYDROCHLORIDE 0.4 MG/1
1 CAPSULE ORAL DAILY
Qty: 30 CAPSULE | Refills: 11 | Status: SHIPPED | OUTPATIENT
Start: 2019-01-02 | End: 2020-01-06

## 2019-01-02 NOTE — PROGRESS NOTES
Chief Complaint: Elevated PSA    HPI:   1/2/19: No sig problems.  Feeling fine.  Had an episode of mild retention and went to the ER where he was given flomax and he voids better on it.  Takes it every 2d or so.  4/12/17: No problems at all.  4/11/16: No PSA done will order today.  Doing well.  Says that for the last four months when he drinks a bunch of beer he gets bilateral flank pain. No hematuria.  3/4/15: PSA essentially unchanged.  Oxybutinin and caffeine cessation have improved OAB but not even needing the oxybutinin lately.  10/10/14: Pt states that after a couple of beers his testicles start to get a dull pain and he also has some SP pressure before voiding.  Then he voids and it is relieved.  Fine during the day.  Really a problem when he drinks too much.  Good stream.  Doesn't hold too long.  Drinks coffee in AM, coke or two during the day, VO/coke,   9/3/14: Pt doing well from cyst excision.  PSA 8.4 on recheck after 14d cipro for PSA 10.  Left testicle the same.  7/9/14: 67 yo man has a dull left testicular pain when he goes to bed but not during most of the day.  Keeps him from sleeping a little bit.  No hematuria.  No urolithiasis history.  Saw Dr. Haskins and another urologist for BPH and elevated PSA.  Had two negative prostate biopsies last 4 years ago and he was advised to have a third and he declined.  No urinary bother.  No constipation.  No hernias or related surgeries.    Allergies:  Patient has no known allergies.    Medications:  has a current medication list which includes the following prescription(s): tamsulosin.    Review of Systems:  General: No fever, chills, fatigability, or weight loss.  Skin: No rashes, itching, or changes in color or texture of skin.  Chest: Denies AQUINO, cyanosis, wheezing, cough, and sputum production.  Abdomen: Appetite fine. No weight loss. Denies diarrhea, abdominal pain, hematemesis, or blood in stool.  Musculoskeletal: No joint stiffness or swelling. Some back  pain.  : As above.  All other review of systems negative.    PMH:   has a past medical history of Eczema and Elevated PSA.    PSH:   has a past surgical history that includes Appendectomy and left foot surgery.    FamHx: family history includes Cancer in his father.    SocHx:  reports that he has quit smoking. he has never used smokeless tobacco. He reports that he drinks alcohol. He reports that he does not use drugs.      Physical Exam:  There were no vitals filed for this visit.  General: A&Ox3, no apparent distress, no deformities  Neck: No masses, normal thyroid  Lungs: normal inspiration, no use of accessory muscles  Heart: normal pulse, no arrhythmias  Abdomen: Soft, NT, ND  Skin: The skin is warm and dry. No jaundice.  Ext: No c/c/e.  :   4/12/17: Test desc priya, no abnormalities of epididymus, nontender. Penis normal, with normal penile and scrotal skin. Meatus normal. Normal rectal tone, no hemorrhoids. Prost >50 gm no nodules or masses appreciated. SV not palpable. Perineum and anus normal.      Labs/Studies:   PSA    7/29/14: 8.4    3/3/15: 9.2    4/16: 10.2    4/17: 9.3  Bladder Scan performed in office:     10/14: PVR 30 ml.    Impression/Plan:   1. Doing really well, PSA now and in 12 mo with RTC 12 mo.  2. Adding finasteride to flomax for long term improvement.

## 2019-01-03 ENCOUNTER — TELEPHONE (OUTPATIENT)
Dept: UROLOGY | Facility: CLINIC | Age: 74
End: 2019-01-03

## 2019-01-03 DIAGNOSIS — R97.20 ELEVATED PSA: Primary | ICD-10-CM

## 2019-01-03 DIAGNOSIS — Z12.5 ENCOUNTER FOR SCREENING FOR MALIGNANT NEOPLASM OF PROSTATE: ICD-10-CM

## 2019-02-04 ENCOUNTER — OFFICE VISIT (OUTPATIENT)
Dept: DERMATOLOGY | Facility: CLINIC | Age: 74
End: 2019-02-04
Payer: MEDICARE

## 2019-02-04 DIAGNOSIS — L08.9 SKIN INFECTION: Primary | ICD-10-CM

## 2019-02-04 DIAGNOSIS — L98.9 ECZEMATOUS SKIN LESIONS: ICD-10-CM

## 2019-02-04 DIAGNOSIS — L20.89 OTHER ATOPIC DERMATITIS: ICD-10-CM

## 2019-02-04 PROCEDURE — 87070 CULTURE OTHR SPECIMN AEROBIC: CPT

## 2019-02-04 PROCEDURE — 99999 PR PBB SHADOW E&M-EST. PATIENT-LVL III: CPT | Mod: PBBFAC,,, | Performed by: DERMATOLOGY

## 2019-02-04 PROCEDURE — 1101F PT FALLS ASSESS-DOCD LE1/YR: CPT | Mod: CPTII,S$GLB,, | Performed by: DERMATOLOGY

## 2019-02-04 PROCEDURE — 87186 SC STD MICRODIL/AGAR DIL: CPT

## 2019-02-04 PROCEDURE — 99999 PR PBB SHADOW E&M-EST. PATIENT-LVL III: ICD-10-PCS | Mod: PBBFAC,,, | Performed by: DERMATOLOGY

## 2019-02-04 PROCEDURE — 99214 OFFICE O/P EST MOD 30 MIN: CPT | Mod: S$GLB,,, | Performed by: DERMATOLOGY

## 2019-02-04 PROCEDURE — 87077 CULTURE AEROBIC IDENTIFY: CPT

## 2019-02-04 PROCEDURE — 1101F PR PT FALLS ASSESS DOC 0-1 FALLS W/OUT INJ PAST YR: ICD-10-PCS | Mod: CPTII,S$GLB,, | Performed by: DERMATOLOGY

## 2019-02-04 PROCEDURE — 99214 PR OFFICE/OUTPT VISIT, EST, LEVL IV, 30-39 MIN: ICD-10-PCS | Mod: S$GLB,,, | Performed by: DERMATOLOGY

## 2019-02-04 RX ORDER — MUPIROCIN 20 MG/G
OINTMENT TOPICAL
Qty: 30 G | Refills: 1 | Status: SHIPPED | OUTPATIENT
Start: 2019-02-04 | End: 2019-03-18 | Stop reason: SDUPTHER

## 2019-02-04 RX ORDER — DOXYCYCLINE 100 MG/1
CAPSULE ORAL
Qty: 20 CAPSULE | Refills: 0 | Status: SHIPPED | OUTPATIENT
Start: 2019-02-04 | End: 2019-03-18 | Stop reason: SDUPTHER

## 2019-02-04 RX ORDER — HALOBETASOL PROPIONATE 0.5 MG/G
OINTMENT TOPICAL 2 TIMES DAILY
Qty: 50 G | Refills: 1 | Status: SHIPPED | OUTPATIENT
Start: 2019-02-04

## 2019-02-04 NOTE — PROGRESS NOTES
Subjective:       Patient ID:  Seamus Benitez is a 73 y.o. male who presents for   Chief Complaint   Patient presents with    Eczema     bilateral LE      Hx of SCC of the right shin (s/p Mohs on 8/13/18 by Dr. Baer), AK's, LSC/eczema, last seen on 7/26/18.  He c/o persistent itch of the right leg.  + intense pruritus, 7/10, worse in evenings. tx ketoconazole cream without improvement.         Review of Systems   Constitutional: Negative for fever and chills.   Gastrointestinal: Negative for nausea and vomiting.   Skin: Positive for itching and rash. Negative for daily sunscreen use, activity-related sunscreen use and recent sunburn.   Hematologic/Lymphatic: Does not bruise/bleed easily.        Objective:    Physical Exam   Constitutional: He appears well-developed and well-nourished. No distress.   Neurological: He is alert and oriented to person, place, and time. He is not disoriented.   Psychiatric: He has a normal mood and affect.   Skin:   Areas Examined (abnormalities noted in diagram):   Head / Face Inspection Performed  Neck Inspection Performed  Chest / Axilla Inspection Performed  RUE Inspected  LUE Inspection Performed  RLE Inspected  LLE Inspection Performed  Nails and Digits Inspection Performed                  Assessment / Plan:        Skin infection  Eczema skin  -     Aerobic culture  -     doxycycline (MONODOX) 100 MG capsule; Take twice daily with food. May cause upset stomach.  Dispense: 20 capsule; Refill: 0  -     mupirocin (BACTROBAN) 2 % ointment; AAA bid for 10 days for skin infection  Dispense: 30 g; Refill: 1  -     crisaborole (EUCRISA) 2 % Oint; AAA bid. Non-steroid ointment  Dispense: 60 g; Refill: 1  -     halobetasol (ULTRAVATE) 0.05 % ointment; Apply topically 2 (two) times daily. Steroid medication.  Start after finishing antibiotics.  Dispense: 50 g; Refill: 1  -     Recommend d/c picking areas.  Will start doxy and mupirocin.  Culture done today.  Once finished with doxy,  recommend pt start halobetasol ointment.  Consider ILK in the future. The patient acknowledged understanding.                Follow-up in about 2 months (around 4/4/2019).

## 2019-02-07 ENCOUNTER — PATIENT MESSAGE (OUTPATIENT)
Dept: DERMATOLOGY | Facility: CLINIC | Age: 74
End: 2019-02-07

## 2019-02-07 LAB — BACTERIA SPEC AEROBE CULT: NORMAL

## 2019-02-18 ENCOUNTER — TELEPHONE (OUTPATIENT)
Dept: DERMATOLOGY | Facility: CLINIC | Age: 74
End: 2019-02-18

## 2019-02-18 NOTE — TELEPHONE ENCOUNTER
Returned call to pt wife (Darling).  Darling states pt has used two tubes of Mupirocin Ointment in the past 14 days and the rash is still there.  Darling originally wanted a refill on the mupirocin but decided to ask Dr James what her recommendations are.  Advised Darling to take a picture of pt's skin and upload it to the portal for Dr James to review and recommend something for pt.  Darling verbalized understanding to all information given and stated she will take picture of 's skin as soon as he gets home.

## 2019-02-18 NOTE — TELEPHONE ENCOUNTER
----- Message from Emmanuel Aguilar sent at 2/18/2019  3:26 PM CST -----  Contact: Darling pt's spouse  Type:  RX Refill Request    Who Called: Spouse  Refill or New Rx: Refill  RX Name and Strength: Antibiotic Ointment  How is the patient currently taking it? (ex. 1XDay): twice daily  Is this a 30 day or 90 day RX: 90  Preferred Pharmacy with phone number: [facility] [address]  in [place]  Local or Mail Order: Local  Ordering Provider: Jacob  Would the patient rather a call back or a response via [facility]?  Call Back  Best Call Back Number: [phone] (cell)    Additional Information: Pt is out of his medication

## 2019-02-19 ENCOUNTER — PATIENT MESSAGE (OUTPATIENT)
Dept: DERMATOLOGY | Facility: CLINIC | Age: 74
End: 2019-02-19

## 2019-02-21 ENCOUNTER — TELEPHONE (OUTPATIENT)
Dept: DERMATOLOGY | Facility: CLINIC | Age: 74
End: 2019-02-21

## 2019-02-21 ENCOUNTER — PATIENT MESSAGE (OUTPATIENT)
Dept: DERMATOLOGY | Facility: CLINIC | Age: 74
End: 2019-02-21

## 2019-02-21 NOTE — TELEPHONE ENCOUNTER
----- Message from Betsy Esteban sent at 2/21/2019  8:48 AM CST -----  Contact: pt's wife  Type:  Patient Returning Call    Who Called: Camelia  Who Left Message for Patient: Lea  Does the patient know what this is regarding?: In regards to the message that was sent to the pt.  Would the patient rather a call back or a response via [facility]? Call back  Best Call Back Number: [phone]  Additional Information: N/A

## 2019-02-21 NOTE — LETTER
July 26, 2018      JOAN Mathews MD  [address] [facility] [address]           [facility]  [address] [place] [id number]  Phone: [phone]  Fax: [phone]          Patient: Seamus Benitez   MR Number: [id number]   YOB: 1945   Date of Visit: 7/26/2018       Dear Dr. JOAN Mathews:    Thank you for referring Seamus Benitez to me for evaluation. Attached you will find relevant portions of my assessment and plan of care.    If you have questions, please do not hesitate to call me. I look forward to following Seamus Benitez along with you.    Sincerely,    Hiral James MD    Enclosure  CC:  No Recipients    If you would like to receive this communication electronically, please contact [email or web address] or [phone] to request more information on [facility] access.    For providers and/or their staff who would like to refer a patient to [facility], please contact us through our one-stop-shop provider referral line, [facility] [place], at [phone].    If you feel you have received this communication in error or would no longer like to receive these types of communications, please e-mail [email or web address]          Malaise and fatigue

## 2019-02-22 ENCOUNTER — CLINICAL SUPPORT (OUTPATIENT)
Dept: DERMATOLOGY | Facility: CLINIC | Age: 74
End: 2019-02-22
Payer: MEDICARE

## 2019-02-22 DIAGNOSIS — R21 RASH: Primary | ICD-10-CM

## 2019-02-22 PROCEDURE — 96372 THER/PROPH/DIAG INJ SC/IM: CPT | Mod: S$GLB,,, | Performed by: DERMATOLOGY

## 2019-02-22 PROCEDURE — 96372 PR INJECTION,THERAP/PROPH/DIAG2ST, IM OR SUBCUT: ICD-10-PCS | Mod: S$GLB,,, | Performed by: DERMATOLOGY

## 2019-02-22 RX ORDER — BETAMETHASONE SODIUM PHOSPHATE AND BETAMETHASONE ACETATE 3; 3 MG/ML; MG/ML
6 INJECTION, SUSPENSION INTRA-ARTICULAR; INTRALESIONAL; INTRAMUSCULAR; SOFT TISSUE
Status: COMPLETED | OUTPATIENT
Start: 2019-02-22 | End: 2019-02-22

## 2019-02-22 RX ORDER — TRIAMCINOLONE ACETONIDE 40 MG/ML
40 INJECTION, SUSPENSION INTRA-ARTICULAR; INTRAMUSCULAR
Status: COMPLETED | OUTPATIENT
Start: 2019-02-22 | End: 2019-02-22

## 2019-02-22 RX ADMIN — BETAMETHASONE SODIUM PHOSPHATE AND BETAMETHASONE ACETATE 6 MG: 3; 3 INJECTION, SUSPENSION INTRA-ARTICULAR; INTRALESIONAL; INTRAMUSCULAR; SOFT TISSUE at 09:02

## 2019-02-22 RX ADMIN — TRIAMCINOLONE ACETONIDE 40 MG: 40 INJECTION, SUSPENSION INTRA-ARTICULAR; INTRAMUSCULAR at 09:02

## 2019-02-22 NOTE — PROGRESS NOTES
Pt presents today for IM injections of Celestone and Kenalog 40mg.  Pt received Kenalog 40mg in right upper outer gluteal and Celestone 6mg in left upper outer gluteal.  Pt tolerated well w/o complaint.  He will f/u with MD on 3/18/19 and will contact office if he has questions or concerns before that date.

## 2019-03-18 ENCOUNTER — OFFICE VISIT (OUTPATIENT)
Dept: DERMATOLOGY | Facility: CLINIC | Age: 74
End: 2019-03-18
Payer: MEDICARE

## 2019-03-18 DIAGNOSIS — R21 RASH: ICD-10-CM

## 2019-03-18 DIAGNOSIS — L08.9 SKIN INFECTION: Primary | ICD-10-CM

## 2019-03-18 PROCEDURE — 87070 CULTURE OTHR SPECIMN AEROBIC: CPT

## 2019-03-18 PROCEDURE — 99214 PR OFFICE/OUTPT VISIT, EST, LEVL IV, 30-39 MIN: ICD-10-PCS | Mod: S$GLB,,, | Performed by: DERMATOLOGY

## 2019-03-18 PROCEDURE — 1101F PR PT FALLS ASSESS DOC 0-1 FALLS W/OUT INJ PAST YR: ICD-10-PCS | Mod: CPTII,S$GLB,, | Performed by: DERMATOLOGY

## 2019-03-18 PROCEDURE — 99214 OFFICE O/P EST MOD 30 MIN: CPT | Mod: S$GLB,,, | Performed by: DERMATOLOGY

## 2019-03-18 PROCEDURE — 1101F PT FALLS ASSESS-DOCD LE1/YR: CPT | Mod: CPTII,S$GLB,, | Performed by: DERMATOLOGY

## 2019-03-18 PROCEDURE — 99999 PR PBB SHADOW E&M-EST. PATIENT-LVL III: ICD-10-PCS | Mod: PBBFAC,,, | Performed by: DERMATOLOGY

## 2019-03-18 PROCEDURE — 87077 CULTURE AEROBIC IDENTIFY: CPT

## 2019-03-18 PROCEDURE — 87186 SC STD MICRODIL/AGAR DIL: CPT

## 2019-03-18 PROCEDURE — 99999 PR PBB SHADOW E&M-EST. PATIENT-LVL III: CPT | Mod: PBBFAC,,, | Performed by: DERMATOLOGY

## 2019-03-18 RX ORDER — MUPIROCIN 20 MG/G
OINTMENT TOPICAL
Qty: 60 G | Refills: 1 | Status: SHIPPED | OUTPATIENT
Start: 2019-03-18 | End: 2022-08-31 | Stop reason: SDUPTHER

## 2019-03-18 RX ORDER — DOXYCYCLINE 100 MG/1
CAPSULE ORAL
Qty: 20 CAPSULE | Refills: 0 | Status: SHIPPED | OUTPATIENT
Start: 2019-03-18 | End: 2019-06-27 | Stop reason: ALTCHOICE

## 2019-03-18 RX ORDER — LEVOCETIRIZINE DIHYDROCHLORIDE 5 MG/1
TABLET, FILM COATED ORAL
Qty: 30 TABLET | Refills: 11 | Status: SHIPPED | OUTPATIENT
Start: 2019-03-18

## 2019-03-18 NOTE — PROGRESS NOTES
Subjective:       Patient ID:  Seamus Benitez is a 73 y.o. male who presents for   Chief Complaint   Patient presents with    Follow-up     rashes on legs and upper arms, injections from prior visits helped a little       Hx of MRSA skin infection, last seen on 2/4/19.  He is s/p doxy and mupirocin and later IM steroids as a nurses's visit on 2/22/19. Rash has persisted and recently worsened.  + intense pruritus.     Current Skin Care Regimen  Soap: hibiclens, dial  Moisturizer: curel, aveeno similar, amlactin  Detergent:all free and clear   Fabric softener: gain dryer sheets  Colognes/Perfumes/Fragrances: none  Bathing: baths, showers, warm, apple cider viengar          Review of Systems   Constitutional: Negative for fever and chills.   Gastrointestinal: Negative for nausea and vomiting.   Skin: Positive for itching and rash. Negative for daily sunscreen use, activity-related sunscreen use and recent sunburn.   Hematologic/Lymphatic: Does not bruise/bleed easily.        Objective:    Physical Exam   Constitutional: He appears well-developed and well-nourished. No distress.   Neurological: He is alert and oriented to person, place, and time. He is not disoriented.   Psychiatric: He has a normal mood and affect.   Skin:   Areas Examined (abnormalities noted in diagram):   Scalp / Hair Palpated and Inspected  Head / Face Inspection Performed  Neck Inspection Performed  Chest / Axilla Inspection Performed  Abdomen Inspection Performed  Genitals / Buttocks / Groin Inspection Performed  Back Inspection Performed  RUE Inspected  LUE Inspection Performed  RLE Inspected  LLE Inspection Performed  Nails and Digits Inspection Performed                  Assessment / Plan:        Skin infection  -     Aerobic culture  -     doxycycline (MONODOX) 100 MG capsule; Take twice daily with food. May cause upset stomach.  Dispense: 20 capsule; Refill: 0  -     mupirocin (BACTROBAN) 2 % ointment; AAA bid for 10 days for skin  infection  Dispense: 60 g; Refill: 1  -     Discussed d/c scratching areas. Will start above med. If MRSA, will also add rifampin.  If negative, will perform ILK as nurse's visit in 2 weeks.     Rash  -     levocetirizine (XYZAL) 5 MG tablet; Take 1 tablet each evening  Dispense: 30 tablet; Refill: 11  -     Hx of falls, will avoid hydroxyzine/sedating anti-histamines.              Follow-up in about 4 weeks (around 4/15/2019).

## 2019-03-21 DIAGNOSIS — L08.9 SKIN INFECTION: Primary | ICD-10-CM

## 2019-03-21 RX ORDER — RIFAMPIN 300 MG/1
300 CAPSULE ORAL EVERY 12 HOURS
Qty: 28 CAPSULE | Refills: 0 | Status: SHIPPED | OUTPATIENT
Start: 2019-03-21 | End: 2019-04-04

## 2019-03-22 LAB — BACTERIA SPEC AEROBE CULT: NORMAL

## 2019-03-26 ENCOUNTER — PATIENT MESSAGE (OUTPATIENT)
Dept: DERMATOLOGY | Facility: CLINIC | Age: 74
End: 2019-03-26

## 2019-04-12 DIAGNOSIS — Z12.11 COLON CANCER SCREENING: ICD-10-CM

## 2019-06-26 ENCOUNTER — TELEPHONE (OUTPATIENT)
Dept: DERMATOLOGY | Facility: CLINIC | Age: 74
End: 2019-06-26

## 2019-06-27 ENCOUNTER — OFFICE VISIT (OUTPATIENT)
Dept: DERMATOLOGY | Facility: CLINIC | Age: 74
End: 2019-06-27
Payer: MEDICARE

## 2019-06-27 ENCOUNTER — TELEPHONE (OUTPATIENT)
Dept: DERMATOLOGY | Facility: CLINIC | Age: 74
End: 2019-06-27

## 2019-06-27 DIAGNOSIS — L01.00 IMPETIGO: ICD-10-CM

## 2019-06-27 DIAGNOSIS — L98.9 ECZEMATOUS SKIN LESIONS: Primary | ICD-10-CM

## 2019-06-27 PROCEDURE — 99214 PR OFFICE/OUTPT VISIT, EST, LEVL IV, 30-39 MIN: ICD-10-PCS | Mod: S$GLB,,, | Performed by: DERMATOLOGY

## 2019-06-27 PROCEDURE — 99999 PR PBB SHADOW E&M-EST. PATIENT-LVL II: ICD-10-PCS | Mod: PBBFAC,,, | Performed by: DERMATOLOGY

## 2019-06-27 PROCEDURE — 99499 UNLISTED E&M SERVICE: CPT | Mod: S$GLB,,, | Performed by: DERMATOLOGY

## 2019-06-27 PROCEDURE — 1101F PT FALLS ASSESS-DOCD LE1/YR: CPT | Mod: CPTII,S$GLB,, | Performed by: DERMATOLOGY

## 2019-06-27 PROCEDURE — 99999 PR PBB SHADOW E&M-EST. PATIENT-LVL II: CPT | Mod: PBBFAC,,, | Performed by: DERMATOLOGY

## 2019-06-27 PROCEDURE — 99214 OFFICE O/P EST MOD 30 MIN: CPT | Mod: S$GLB,,, | Performed by: DERMATOLOGY

## 2019-06-27 PROCEDURE — 99499 RISK ADDL DX/OHS AUDIT: ICD-10-PCS | Mod: S$GLB,,, | Performed by: DERMATOLOGY

## 2019-06-27 PROCEDURE — 1101F PR PT FALLS ASSESS DOC 0-1 FALLS W/OUT INJ PAST YR: ICD-10-PCS | Mod: CPTII,S$GLB,, | Performed by: DERMATOLOGY

## 2019-06-27 PROCEDURE — 87070 CULTURE OTHR SPECIMN AEROBIC: CPT

## 2019-06-27 RX ORDER — LEVOCETIRIZINE DIHYDROCHLORIDE 5 MG/1
TABLET, FILM COATED ORAL
Qty: 30 TABLET | Refills: 11 | Status: SHIPPED | OUTPATIENT
Start: 2019-06-27 | End: 2019-07-10 | Stop reason: SDUPTHER

## 2019-06-27 RX ORDER — DOXYCYCLINE 100 MG/1
CAPSULE ORAL
Qty: 20 CAPSULE | Refills: 0 | Status: SHIPPED | OUTPATIENT
Start: 2019-06-27 | End: 2019-08-21

## 2019-06-27 RX ORDER — MUPIROCIN 20 MG/G
OINTMENT TOPICAL 2 TIMES DAILY
Qty: 60 G | Refills: 1 | Status: SHIPPED | OUTPATIENT
Start: 2019-06-27 | End: 2019-07-10 | Stop reason: SDUPTHER

## 2019-06-27 RX ORDER — DOXEPIN HYDROCHLORIDE 10 MG/1
10 CAPSULE ORAL NIGHTLY
Qty: 30 CAPSULE | Refills: 3 | Status: SHIPPED | OUTPATIENT
Start: 2019-06-27 | End: 2019-10-05 | Stop reason: SDUPTHER

## 2019-06-27 NOTE — PATIENT INSTRUCTIONS
Start hibiclens (chlorahexidine) showers 2 times per week  Recommend dilute bleach baths 2 times per week and discussed protocol -- add 1/4 cup of bleach to lukewarm water and soak for 10 - 15 minutes.      Understanding Impetigo  Impetigo is a common bacterial infection of the skin. It most often affects the face, arms, and legs. But it can appear on any part of the body. Anyone can have it, regardless of age. But it is most common in children. Impetigo is very contagious. This means it spreads easily to other people.  How to say it  [id number]   What causes impetigo?  Many types of bacteria live on normal, healthy skin. The bacteria usually dont cause problems. Impetigo happens when bacteria enter the skin through a scratch, break, sore, bite, or irritated spot. They then begin to grow out of control, leading to infection. There are two types of staphylococcus bacteria that cause impetigo. In certain cases, impetigo appears on skin that has no visible break. It may be more likely to occur on skin that has another skin problem, such as eczema. It may also be more common after a cold or other virus.  Symptoms of impetigo  Symptoms of this problem include:  · Small, fluid-filled blisters on the skin that may itch, ooze, or crust  · A yellow, honey-colored crust on the infected skin  · Skin sores that spread with scratching  · An itchy rash that spreads with scratching  · Swollen lymph nodes  Treatment for impetigo  The goal is to treat the infection and prevent it from spreading to others.  · You will likely be given an antibiotic to treat the infection. This may be a cream or ointment called muporicin to put on your skin. If the infection is severe or spreading, you may be given antibiotic medicine to take by mouth. Be sure to use this medicine as directed. Do not stop using it until you are told to stop, even if your skin gets better. If you stop too soon, the infection may come back and be harder to  treat.  · Avoid scratching or picking at your sores. It may help to cover affected areas with a bandage.  · To prevent spreading the infection, wash your hands often. Avoid sharing personal items, towels, clothes, pillows, and sheets with others. After each use, wash these items in hot water.  · Clean the affected skin several times a day. Dont scrub. Instead, soak the area in warm, soapy water. This will help remove the crust that forms. For places that you can't soak, such as the face, place a clean, warm (not hot) washcloth on the affected area. Use a new washcloth and towel each time.  When to call your healthcare provider  Call your healthcare provider right away if you have any of these:  · Fever of 100.4°F (38°C) or higher, or as directed  · Increasing number of sores or spreading areas of redness after 2 days of treatment with antibiotics  · Increasing swelling or pain  · Increased amounts of fluid or pus coming from the sores  · Unusual drowsiness, weakness, or change in behavior  · Loss of appetite or vomiting   Date Last Reviewed: 5/1/2016  © 6353-8282 [employer]. [address] [place] [address], [place] [address]. All rights reserved. This information is not intended as a substitute for professional medical care. Always follow your healthcare professional's instructions.

## 2019-06-27 NOTE — TELEPHONE ENCOUNTER
----- Message from Jg Shankar sent at 6/27/2019  8:31 AM CDT -----  Contact: pt   .Type:  Patient Returning Call    Who Called: pt   Who Left Message for Patient: Deonna   Does the patient know what this is regarding?: pt not sure   Would the patient rather a call back or a response via [facility]? Callback   Best Call Back Number: ..[phone]      Additional Information:

## 2019-06-27 NOTE — TELEPHONE ENCOUNTER
S/w and scheduled him to come in sooner. (today) pt stated he wanted to wait for the procedure since right now he is having a flare and possible infection.

## 2019-06-27 NOTE — PROGRESS NOTES
Subjective:       Patient ID:  Seamus Benitez is a 73 y.o. male who presents for   Chief Complaint   Patient presents with    Follow-up    Recurrent Skin Infections     no improvement      Hx of SCC of the right shin (s/p Mohs on 8/13/18 by Dr. Baer), MRSA/MSSA skin infection, AK's, LSC/eczema of the legs, last seen on 3/18/19.  He is s/p doxy and mupirocin courses in 2/2019 and 3/2019. C/o flare of skin for the past several weeks.     Prior tx: doxy x 2 courses, mupirocin, xyzal ETOH, halobetasol oint, eucrisa (dc'd due to rash), thyme out      Review of Systems   Constitutional: Negative for fever and chills.   Gastrointestinal: Negative for nausea and vomiting.   Skin: Positive for rash. Negative for itching, daily sunscreen use, activity-related sunscreen use and recent sunburn.   Hematologic/Lymphatic: Does not bruise/bleed easily.        Objective:    Physical Exam   Constitutional: He appears well-developed and well-nourished. No distress.   Neurological: He is alert and oriented to person, place, and time. He is not disoriented.   Psychiatric: He has a normal mood and affect.   Skin:   Areas Examined (abnormalities noted in diagram):   Head / Face Inspection Performed  Neck Inspection Performed  Chest / Axilla Inspection Performed  Abdomen Inspection Performed  Back Inspection Performed  RUE Inspected  LUE Inspection Performed  RLE Inspected  LLE Inspection Performed  Nails and Digits Inspection Performed                  Assessment / Plan:        Eczematous skin lesions  Impetigo  -     levocetirizine (XYZAL) 5 MG tablet; Take 1 tablet each morning.  Dispense: 30 tablet; Refill: 11  -     doxycycline (MONODOX) 100 MG capsule; Take twice daily with food for 10 days  Dispense: 20 capsule; Refill: 0  -     doxepin (SINEQUAN) 10 MG capsule; Take 1 capsule (10 mg total) by mouth every evening.  Dispense: 30 capsule; Refill: 3  -     Aerobic culture  -     mupirocin (BACTROBAN) 2 % ointment; Apply  topically 2 (two) times daily.  Dispense: 60 g; Refill: 1  -     Culture done today.  Will start empiric antibiotics, xyzal and doxepin.  Consider ILK once possible impetigo improved.           Follow up in about 2 weeks (around 7/11/2019).

## 2019-07-01 LAB — BACTERIA SPEC AEROBE CULT: NORMAL

## 2019-07-10 ENCOUNTER — OFFICE VISIT (OUTPATIENT)
Dept: DERMATOLOGY | Facility: CLINIC | Age: 74
End: 2019-07-10
Payer: MEDICARE

## 2019-07-10 DIAGNOSIS — L98.9 ECZEMATOUS SKIN LESIONS: Primary | ICD-10-CM

## 2019-07-10 PROCEDURE — 99999 PR PBB SHADOW E&M-EST. PATIENT-LVL II: CPT | Mod: PBBFAC,,, | Performed by: DERMATOLOGY

## 2019-07-10 PROCEDURE — 1101F PT FALLS ASSESS-DOCD LE1/YR: CPT | Mod: CPTII,S$GLB,, | Performed by: DERMATOLOGY

## 2019-07-10 PROCEDURE — 99213 OFFICE O/P EST LOW 20 MIN: CPT | Mod: 25,S$GLB,, | Performed by: DERMATOLOGY

## 2019-07-10 PROCEDURE — 96372 PR INJECTION,THERAP/PROPH/DIAG2ST, IM OR SUBCUT: ICD-10-PCS | Mod: S$GLB,,, | Performed by: DERMATOLOGY

## 2019-07-10 PROCEDURE — 99999 PR PBB SHADOW E&M-EST. PATIENT-LVL II: ICD-10-PCS | Mod: PBBFAC,,, | Performed by: DERMATOLOGY

## 2019-07-10 PROCEDURE — 99213 PR OFFICE/OUTPT VISIT, EST, LEVL III, 20-29 MIN: ICD-10-PCS | Mod: 25,S$GLB,, | Performed by: DERMATOLOGY

## 2019-07-10 PROCEDURE — 96372 THER/PROPH/DIAG INJ SC/IM: CPT | Mod: S$GLB,,, | Performed by: DERMATOLOGY

## 2019-07-10 PROCEDURE — 1101F PR PT FALLS ASSESS DOC 0-1 FALLS W/OUT INJ PAST YR: ICD-10-PCS | Mod: CPTII,S$GLB,, | Performed by: DERMATOLOGY

## 2019-07-10 RX ORDER — MOMETASONE FUROATE 1 MG/G
CREAM TOPICAL
Qty: 45 G | Refills: 3 | Status: SHIPPED | OUTPATIENT
Start: 2019-07-10 | End: 2020-04-20

## 2019-07-10 RX ORDER — BETAMETHASONE SODIUM PHOSPHATE AND BETAMETHASONE ACETATE 3; 3 MG/ML; MG/ML
6 INJECTION, SUSPENSION INTRA-ARTICULAR; INTRALESIONAL; INTRAMUSCULAR; SOFT TISSUE
Status: COMPLETED | OUTPATIENT
Start: 2019-07-10 | End: 2019-07-10

## 2019-07-10 RX ORDER — DOXYCYCLINE 100 MG/1
CAPSULE ORAL
Qty: 30 CAPSULE | Refills: 0 | Status: SHIPPED | OUTPATIENT
Start: 2019-07-10 | End: 2019-08-21 | Stop reason: SDUPTHER

## 2019-07-10 RX ORDER — TRIAMCINOLONE ACETONIDE 40 MG/ML
40 INJECTION, SUSPENSION INTRA-ARTICULAR; INTRAMUSCULAR
Status: COMPLETED | OUTPATIENT
Start: 2019-07-10 | End: 2019-07-10

## 2019-07-10 RX ADMIN — TRIAMCINOLONE ACETONIDE 40 MG: 40 INJECTION, SUSPENSION INTRA-ARTICULAR; INTRAMUSCULAR at 09:07

## 2019-07-10 RX ADMIN — BETAMETHASONE SODIUM PHOSPHATE AND BETAMETHASONE ACETATE 6 MG: 3; 3 INJECTION, SUSPENSION INTRA-ARTICULAR; INTRALESIONAL; INTRAMUSCULAR; SOFT TISSUE at 09:07

## 2019-07-10 NOTE — PROGRESS NOTES
Subjective:       Patient ID:  Seamus Benitez is a 73 y.o. male who presents for   Chief Complaint   Patient presents with    Rash     f/u on rash to chest, arms and right lower leg     Hx of SCC of the right shin (s/p Mohs on 8/13/18 by Dr. Baer), MRSA/MSSA skin infection, AK's, LSC/eczema of the legs with persistent plaque of the right lower leg s/p Mohs surger, last seen on 6/27/19.  He is s/p rash of the upper chest and arms, with improvement, he believes related to oral antibiotic. He is s/p culture which was negative of eczema of the right lower leg. He is s/p doxy and mupirocin courses in 6/2019, 2/2019 and 3/2019. C/o flare of skin for the past several weeks.     Prior tx: doxy x 2 courses, IM steroids, mupirocin, xyzal ETOH, halobetasol oint, eucrisa (dc'd due to rash), thyme out      Review of Systems   Constitutional: Negative for fever and chills.   Gastrointestinal: Negative for nausea and vomiting.   Skin: Negative for daily sunscreen use, activity-related sunscreen use and recent sunburn.   Hematologic/Lymphatic: Does not bruise/bleed easily.        Objective:    Physical Exam   Constitutional: He appears well-developed and well-nourished. No distress.   Neurological: He is alert and oriented to person, place, and time. He is not disoriented.   Psychiatric: He has a normal mood and affect.   Skin:   Areas Examined (abnormalities noted in diagram):   Head / Face Inspection Performed  Neck Inspection Performed  RUE Inspected  LUE Inspection Performed  RLE Inspected  LLE Inspection Performed  Nails and Digits Inspection Performed                Assessment / Plan:        Eczematous skin lesions  -     mometasone 0.1% (ELOCON) 0.1 % cream; AAA bid for rash on right leg  Dispense: 45 g; Refill: 3  -     betamethasone acetate-betamethasone sodium phosphate injection 6 mg  -     triamcinolone acetonide injection 40 mg  -     Will add above med and give IM steroids. Culture negative at last visit,  however pt reports improvement with doxy.  Will refill doxy x 1 month q daily and xyzal.  The patient acknowledged understanding.            Follow up in about 1 month (around 8/7/2019).

## 2019-07-10 NOTE — PATIENT INSTRUCTIONS
New Skin Care Regimen  Soap: Dove sensitive skin bar or liquid  Moisturizer: ceraVe or cetaphil cream  Detergent: Tide Free, All Free, or Cheer Free   Fabric softener: do not use  Colognes/Perfumes/Fragrances: do not use  Bathing: shower or bathe with lukewarm water < 10 minutes      Preventing Skin Cancer  Relaxing in the sun may feel good. But it isnt good for your skin. In fact, being exposed to the suns harmful rays is a major cause of skin cancer. This is a serious disease that can be life-threatening. People of all ages and backgrounds are at risk. But in most cases, skin cancer can be prevented.    Your Role in Prevention  You can act today to help prevent skin cancer. Start by avoiding the suns UV (ultraviolet) rays. And dont use tanning beds, which are no safer than the sun. Taking these steps can help keep you from getting skin cancer. It can also help prevent wrinkles and other sun-induced aging effects. Make sure your children also follow these safeguards. Now is the time to start taking preventive steps against skin cancer.  When You Are Outdoors  Protect your skin when you go outdoors during the day. Take precautions whenever you go out to eat, run errands by car or on foot, or do any outdoor activity. There isnt just one easy way to protect your skin. Its best to follow all of these steps:  · Wear tightly woven clothing that covers your skin. Put on a wide-brimmed hat to protect your face, ears, and scalp.  · Watch the clock. Try to avoid the sun between 10 a.m. and 4 p.m., when it is strongest.  · Head for the shade or create your own. Use an umbrella when sitting or strolling.  · Know that the suns rays can reflect off sand, water, and snow. This can harm your skin. Take extra care when you are near reflective surfaces.  · Keep in mind that even when the weather is hazy or cloudy, your skin can be exposed to strong UV rays.  · Shield your skin with sunscreen. Also, apply sunscreen to your  childrens skin.  Tips for Using Sunscreen  To help prevent skin cancer, choose the right sunscreen and use it correctly. Try the following tips:  · Choose a sunscreen that has a sun protection factor (SPF) of at least 15. For the best protection, an SPF of at least 30 is preferred. Also, choose a sunscreen labeled broad spectrum. This will shield you from both UVA and UVB (ultraviolet A and B) rays.  · If one brand irritates your skin, try another, particularly ones without fragrance.  · Use a water-resistant sunscreen if swimming or sweating.  · Reapply sunscreen every 2 hours. If youre active, do this more often.  · Cover any sun-exposed skin, from your face to your feet. Dont forget your ears and your lips.  · Know that while sunscreen helps protect you, it isnt enough. You should also wear protective clothing. And try to stay out of the sun as much as you can, especially from 10 a.m. to 4 p.m.  © 1324-6896 [employer]. [address] [place] [address], [place] [address]. All rights reserved. This information is not intended as a substitute for professional medical care. Always follow your healthcare professional's instructions.

## 2019-08-21 DIAGNOSIS — L98.9 ECZEMATOUS SKIN LESIONS: ICD-10-CM

## 2019-08-21 RX ORDER — DOXYCYCLINE 100 MG/1
CAPSULE ORAL
Qty: 30 CAPSULE | Refills: 0 | Status: SHIPPED | OUTPATIENT
Start: 2019-08-21 | End: 2019-10-12 | Stop reason: SDUPTHER

## 2019-08-22 NOTE — TELEPHONE ENCOUNTER
----- Message from Ruthann Kim sent at 8/22/2019 12:20 PM CDT -----  Contact: self [phone]  12:19pm  Pt states  he may be 15 min late for appt. Please call back at [phone].  Md Chris

## 2019-10-05 DIAGNOSIS — L98.9 ECZEMATOUS SKIN LESIONS: ICD-10-CM

## 2019-10-05 DIAGNOSIS — L01.00 IMPETIGO: ICD-10-CM

## 2019-10-07 RX ORDER — DOXEPIN HYDROCHLORIDE 10 MG/1
10 CAPSULE ORAL NIGHTLY
Qty: 90 CAPSULE | Refills: 1 | Status: SHIPPED | OUTPATIENT
Start: 2019-10-07

## 2019-10-12 DIAGNOSIS — L98.9 ECZEMATOUS SKIN LESIONS: ICD-10-CM

## 2019-10-16 RX ORDER — DOXYCYCLINE 100 MG/1
CAPSULE ORAL
Qty: 30 CAPSULE | Refills: 0 | Status: SHIPPED | OUTPATIENT
Start: 2019-10-16

## 2020-01-06 RX ORDER — TAMSULOSIN HYDROCHLORIDE 0.4 MG/1
CAPSULE ORAL
Qty: 90 CAPSULE | Refills: 3 | Status: SHIPPED | OUTPATIENT
Start: 2020-01-06 | End: 2020-01-08 | Stop reason: SDUPTHER

## 2020-01-08 ENCOUNTER — LAB VISIT (OUTPATIENT)
Dept: LAB | Facility: HOSPITAL | Age: 75
End: 2020-01-08
Attending: UROLOGY
Payer: MEDICARE

## 2020-01-08 ENCOUNTER — OFFICE VISIT (OUTPATIENT)
Dept: UROLOGY | Facility: CLINIC | Age: 75
End: 2020-01-08
Payer: MEDICARE

## 2020-01-08 VITALS
BODY MASS INDEX: 27.48 KG/M2 | WEIGHT: 175.06 LBS | SYSTOLIC BLOOD PRESSURE: 130 MMHG | DIASTOLIC BLOOD PRESSURE: 80 MMHG | HEIGHT: 67 IN

## 2020-01-08 DIAGNOSIS — N52.9 ERECTILE DYSFUNCTION, UNSPECIFIED ERECTILE DYSFUNCTION TYPE: ICD-10-CM

## 2020-01-08 DIAGNOSIS — N40.0 BENIGN PROSTATIC HYPERPLASIA, UNSPECIFIED WHETHER LOWER URINARY TRACT SYMPTOMS PRESENT: ICD-10-CM

## 2020-01-08 DIAGNOSIS — Z12.5 PROSTATE CANCER SCREENING: ICD-10-CM

## 2020-01-08 DIAGNOSIS — Z12.5 PROSTATE CANCER SCREENING: Primary | ICD-10-CM

## 2020-01-08 LAB
BILIRUB SERPL-MCNC: NORMAL MG/DL
BLOOD URINE, POC: NORMAL
COLOR, POC UA: YELLOW
GLUCOSE UR QL STRIP: NORMAL
KETONES UR QL STRIP: NORMAL
LEUKOCYTE ESTERASE URINE, POC: NORMAL
NITRITE, POC UA: NORMAL
PH, POC UA: 5
PROTEIN, POC: NORMAL
SPECIFIC GRAVITY, POC UA: 1.01
UROBILINOGEN, POC UA: NORMAL

## 2020-01-08 PROCEDURE — 1101F PT FALLS ASSESS-DOCD LE1/YR: CPT | Mod: CPTII,S$GLB,, | Performed by: UROLOGY

## 2020-01-08 PROCEDURE — 99214 OFFICE O/P EST MOD 30 MIN: CPT | Mod: 25,S$GLB,, | Performed by: UROLOGY

## 2020-01-08 PROCEDURE — 1101F PR PT FALLS ASSESS DOC 0-1 FALLS W/OUT INJ PAST YR: ICD-10-PCS | Mod: CPTII,S$GLB,, | Performed by: UROLOGY

## 2020-01-08 PROCEDURE — 1159F PR MEDICATION LIST DOCUMENTED IN MEDICAL RECORD: ICD-10-PCS | Mod: S$GLB,,, | Performed by: UROLOGY

## 2020-01-08 PROCEDURE — 36415 COLL VENOUS BLD VENIPUNCTURE: CPT

## 2020-01-08 PROCEDURE — 1126F PR PAIN SEVERITY QUANTIFIED, NO PAIN PRESENT: ICD-10-PCS | Mod: S$GLB,,, | Performed by: UROLOGY

## 2020-01-08 PROCEDURE — 84153 ASSAY OF PSA TOTAL: CPT

## 2020-01-08 PROCEDURE — 99214 PR OFFICE/OUTPT VISIT, EST, LEVL IV, 30-39 MIN: ICD-10-PCS | Mod: 25,S$GLB,, | Performed by: UROLOGY

## 2020-01-08 PROCEDURE — 99999 PR PBB SHADOW E&M-EST. PATIENT-LVL II: CPT | Mod: PBBFAC,,, | Performed by: UROLOGY

## 2020-01-08 PROCEDURE — 1126F AMNT PAIN NOTED NONE PRSNT: CPT | Mod: S$GLB,,, | Performed by: UROLOGY

## 2020-01-08 PROCEDURE — 81002 POCT URINE DIPSTICK WITHOUT MICROSCOPE: ICD-10-PCS | Mod: S$GLB,,, | Performed by: UROLOGY

## 2020-01-08 PROCEDURE — 81002 URINALYSIS NONAUTO W/O SCOPE: CPT | Mod: S$GLB,,, | Performed by: UROLOGY

## 2020-01-08 PROCEDURE — 99999 PR PBB SHADOW E&M-EST. PATIENT-LVL II: ICD-10-PCS | Mod: PBBFAC,,, | Performed by: UROLOGY

## 2020-01-08 PROCEDURE — 1159F MED LIST DOCD IN RCRD: CPT | Mod: S$GLB,,, | Performed by: UROLOGY

## 2020-01-08 RX ORDER — FINASTERIDE 5 MG/1
5 TABLET, FILM COATED ORAL DAILY
Qty: 90 TABLET | Refills: 3 | Status: SHIPPED | OUTPATIENT
Start: 2020-01-08 | End: 2021-02-25 | Stop reason: SDUPTHER

## 2020-01-08 RX ORDER — TAMSULOSIN HYDROCHLORIDE 0.4 MG/1
1 CAPSULE ORAL DAILY
Qty: 90 CAPSULE | Refills: 3 | Status: SHIPPED | OUTPATIENT
Start: 2020-01-08 | End: 2021-02-25 | Stop reason: SDUPTHER

## 2020-01-08 RX ORDER — TADALAFIL 20 MG/1
20 TABLET ORAL DAILY
Qty: 30 TABLET | Refills: 11 | Status: SHIPPED | OUTPATIENT
Start: 2020-01-08 | End: 2021-08-12 | Stop reason: SDUPTHER

## 2020-01-08 NOTE — PROGRESS NOTES
Chief Complaint: Elevated PSA    HPI:   1/8/20: Asks about ED meds; discussed.  Emptying well now better than a year ago. Reviewed history in detail. Not sure he started finasteride.   1/2/19: No sig problems.  Feeling fine.  Had an episode of mild retention and went to the ER where he was given flomax and he voids better on it.  Takes it every 2d or so.  4/12/17: No problems at all.  4/11/16: No PSA done will order today.  Doing well.  Says that for the last four months when he drinks a bunch of beer he gets bilateral flank pain. No hematuria.  3/4/15: PSA essentially unchanged.  Oxybutinin and caffeine cessation have improved OAB but not even needing the oxybutinin lately.  10/10/14: Pt states that after a couple of beers his testicles start to get a dull pain and he also has some SP pressure before voiding.  Then he voids and it is relieved.  Fine during the day.  Really a problem when he drinks too much.  Good stream.  Doesn't hold too long.  Drinks coffee in AM, coke or two during the day, VO/coke,   9/3/14: Pt doing well from cyst excision.  PSA 8.4 on recheck after 14d cipro for PSA 10.  Left testicle the same.  7/9/14: 69 yo man has a dull left testicular pain when he goes to bed but not during most of the day.  Keeps him from sleeping a little bit.  No hematuria.  No urolithiasis history.  Saw Dr. Haskins and another urologist for BPH and elevated PSA.  Had two negative prostate biopsies last 4 years ago and he was advised to have a third and he declined.  No urinary bother.  No constipation.  No hernias or related surgeries.    Allergies:  Eucrisa [crisaborole]    Medications:  has a current medication list which includes the following prescription(s): halobetasol, mometasone 0.1%, tamsulosin, doxepin, doxycycline, levocetirizine, and mupirocin.    Review of Systems:  General: No fever, chills, fatigability, or weight loss.  Skin: No rashes, itching, or changes in color or texture of skin.  Chest: Denies AQUINO,  cyanosis, wheezing, cough, and sputum production.  Abdomen: Appetite fine. No weight loss. Denies diarrhea, abdominal pain, hematemesis, or blood in stool.  Musculoskeletal: No joint stiffness or swelling. Some back pain.  : As above.  All other review of systems negative.    PMH:   has a past medical history of Eczema and Elevated PSA.    PSH:   has a past surgical history that includes Appendectomy and left foot surgery.    FamHx: family history includes Cancer in his father.    SocHx:  reports that he has quit smoking. He has never used smokeless tobacco. He reports that he drinks alcohol. He reports that he does not use drugs.      Physical Exam:  Vitals:    01/08/20 1453   BP: 130/80     General: A&Ox3, no apparent distress, no deformities  Neck: No masses, normal thyroid  Lungs: normal inspiration, no use of accessory muscles  Heart: normal pulse, no arrhythmias  Abdomen: Soft, NT, ND  Skin: The skin is warm and dry. No jaundice.  Ext: No c/c/e.  :   4/12/17: Test desc priya, no abnormalities of epididymus, nontender. Penis normal, with normal penile and scrotal skin. Meatus normal. Normal rectal tone, no hemorrhoids. Prost >50 gm no nodules or masses appreciated. SV not palpable. Perineum and anus normal.      Labs/Studies:   PSA    7/29/14: 8.4    3/3/15: 9.2    4/16: 10.2    4/17: 9.3    12/19: 12.6  Bladder Scan performed in office:     10/14: PVR 30 ml.    Impression/Plan:   1. Doing really well, PSA now and on RTC 12 mo.  2. BPH: continue flomax and add finasteride again  3. New ED problem -> cialis rx

## 2020-01-09 LAB — COMPLEXED PSA SERPL-MCNC: 11.7 NG/ML (ref 0–4)

## 2020-04-19 DIAGNOSIS — L98.9 ECZEMATOUS SKIN LESIONS: ICD-10-CM

## 2020-04-20 RX ORDER — MOMETASONE FUROATE 1 MG/G
CREAM TOPICAL
Qty: 45 G | Refills: 3 | Status: SHIPPED | OUTPATIENT
Start: 2020-04-20 | End: 2021-02-24

## 2020-04-27 ENCOUNTER — PATIENT MESSAGE (OUTPATIENT)
Dept: PHARMACY | Facility: CLINIC | Age: 75
End: 2020-04-27

## 2020-07-20 ENCOUNTER — TELEPHONE (OUTPATIENT)
Dept: UROLOGY | Facility: CLINIC | Age: 75
End: 2020-07-20

## 2020-07-20 NOTE — TELEPHONE ENCOUNTER
Returned call to pts wife; states pt forgot to take his Flomax for a few days and went into retention; went to [facility] on Saturday and had a martinez placed; ER followup visit scheduled.

## 2020-07-23 ENCOUNTER — OFFICE VISIT (OUTPATIENT)
Dept: UROLOGY | Facility: CLINIC | Age: 75
End: 2020-07-23
Payer: MEDICARE

## 2020-07-23 VITALS
WEIGHT: 169.44 LBS | SYSTOLIC BLOOD PRESSURE: 132 MMHG | TEMPERATURE: 98 F | BODY MASS INDEX: 26.54 KG/M2 | DIASTOLIC BLOOD PRESSURE: 66 MMHG

## 2020-07-23 DIAGNOSIS — R31.9 URINARY TRACT INFECTION WITH HEMATURIA, SITE UNSPECIFIED: ICD-10-CM

## 2020-07-23 DIAGNOSIS — N39.0 URINARY TRACT INFECTION WITH HEMATURIA, SITE UNSPECIFIED: ICD-10-CM

## 2020-07-23 DIAGNOSIS — N40.0 BENIGN PROSTATIC HYPERPLASIA, UNSPECIFIED WHETHER LOWER URINARY TRACT SYMPTOMS PRESENT: Primary | ICD-10-CM

## 2020-07-23 DIAGNOSIS — R31.9 HEMATURIA, UNSPECIFIED TYPE: ICD-10-CM

## 2020-07-23 LAB
BILIRUB SERPL-MCNC: NORMAL MG/DL
BLOOD URINE, POC: 250
CLARITY, POC UA: NORMAL
COLOR, POC UA: YELLOW
GLUCOSE UR QL STRIP: NORMAL
KETONES UR QL STRIP: NORMAL
LEUKOCYTE ESTERASE URINE, POC: NORMAL
NITRITE, POC UA: 30
PH, POC UA: 8
PROTEIN, POC: NORMAL
SPECIFIC GRAVITY, POC UA: 1
UROBILINOGEN, POC UA: 4

## 2020-07-23 PROCEDURE — 99999 PR PBB SHADOW E&M-EST. PATIENT-LVL III: ICD-10-PCS | Mod: PBBFAC,,, | Performed by: UROLOGY

## 2020-07-23 PROCEDURE — 3008F PR BODY MASS INDEX (BMI) DOCUMENTED: ICD-10-PCS | Mod: CPTII,S$GLB,, | Performed by: UROLOGY

## 2020-07-23 PROCEDURE — 1125F AMNT PAIN NOTED PAIN PRSNT: CPT | Mod: S$GLB,,, | Performed by: UROLOGY

## 2020-07-23 PROCEDURE — 99214 PR OFFICE/OUTPT VISIT, EST, LEVL IV, 30-39 MIN: ICD-10-PCS | Mod: 25,S$GLB,, | Performed by: UROLOGY

## 2020-07-23 PROCEDURE — 1159F PR MEDICATION LIST DOCUMENTED IN MEDICAL RECORD: ICD-10-PCS | Mod: S$GLB,,, | Performed by: UROLOGY

## 2020-07-23 PROCEDURE — 99214 OFFICE O/P EST MOD 30 MIN: CPT | Mod: 25,S$GLB,, | Performed by: UROLOGY

## 2020-07-23 PROCEDURE — 99999 PR PBB SHADOW E&M-EST. PATIENT-LVL III: CPT | Mod: PBBFAC,,, | Performed by: UROLOGY

## 2020-07-23 PROCEDURE — 1159F MED LIST DOCD IN RCRD: CPT | Mod: S$GLB,,, | Performed by: UROLOGY

## 2020-07-23 PROCEDURE — 81002 URINALYSIS NONAUTO W/O SCOPE: CPT | Mod: S$GLB,,, | Performed by: UROLOGY

## 2020-07-23 PROCEDURE — 1101F PT FALLS ASSESS-DOCD LE1/YR: CPT | Mod: CPTII,S$GLB,, | Performed by: UROLOGY

## 2020-07-23 PROCEDURE — 3008F BODY MASS INDEX DOCD: CPT | Mod: CPTII,S$GLB,, | Performed by: UROLOGY

## 2020-07-23 PROCEDURE — 1101F PR PT FALLS ASSESS DOC 0-1 FALLS W/OUT INJ PAST YR: ICD-10-PCS | Mod: CPTII,S$GLB,, | Performed by: UROLOGY

## 2020-07-23 PROCEDURE — 81002 POCT URINE DIPSTICK WITHOUT MICROSCOPE: ICD-10-PCS | Mod: S$GLB,,, | Performed by: UROLOGY

## 2020-07-23 PROCEDURE — 1125F PR PAIN SEVERITY QUANTIFIED, PAIN PRESENT: ICD-10-PCS | Mod: S$GLB,,, | Performed by: UROLOGY

## 2020-07-23 RX ORDER — NITROFURANTOIN (MACROCRYSTALS) 100 MG/1
100 CAPSULE ORAL EVERY 12 HOURS
Qty: 14 CAPSULE | Refills: 0 | Status: SHIPPED | OUTPATIENT
Start: 2020-07-23 | End: 2020-07-30

## 2020-07-23 NOTE — PROGRESS NOTES
Chief Complaint: Elevated PSA    HPI:   7/23/20: Stopped the flomax and never really started the finasteride and went into retention last week.  Flomax daily since martinez placed in ER.  Also taking the finasteride.  1/8/20: Asks about ED meds; discussed.  Emptying well now better than a year ago. Reviewed history in detail. Not sure he started finasteride.   1/2/19: No sig problems.  Feeling fine.  Had an episode of mild retention and went to the ER where he was given flomax and he voids better on it.  Takes it every 2d or so.  4/12/17: No problems at all.  4/11/16: No PSA done will order today.  Doing well.  Says that for the last four months when he drinks a bunch of beer he gets bilateral flank pain. No hematuria.  3/4/15: PSA essentially unchanged.  Oxybutinin and caffeine cessation have improved OAB but not even needing the oxybutinin lately.  10/10/14: Pt states that after a couple of beers his testicles start to get a dull pain and he also has some SP pressure before voiding.  Then he voids and it is relieved.  Fine during the day.  Really a problem when he drinks too much.  Good stream.  Doesn't hold too long.  Drinks coffee in AM, coke or two during the day, VO/coke,   9/3/14: Pt doing well from cyst excision.  PSA 8.4 on recheck after 14d cipro for PSA 10.  Left testicle the same.  7/9/14: 67 yo man has a dull left testicular pain when he goes to bed but not during most of the day.  Keeps him from sleeping a little bit.  No hematuria.  No urolithiasis history.  Saw Dr. Haskins and another urologist for BPH and elevated PSA.  Had two negative prostate biopsies last 4 years ago and he was advised to have a third and he declined.  No urinary bother.  No constipation.  No hernias or related surgeries.    Allergies:  Eucrisa [crisaborole]    Medications:  has a current medication list which includes the following prescription(s): finasteride, mometasone 0.1%, tadalafil, tamsulosin, doxepin, doxycycline,  halobetasol, levocetirizine, and mupirocin.    Review of Systems:  General: No fever, chills, fatigability, or weight loss.  Skin: No rashes, itching, or changes in color or texture of skin.  Chest: Denies AQUINO, cyanosis, wheezing, cough, and sputum production.  Abdomen: Appetite fine. No weight loss. Denies diarrhea, abdominal pain, hematemesis, or blood in stool.  Musculoskeletal: No joint stiffness or swelling. Some back pain.  : As above.  All other review of systems negative.    PMH:   has a past medical history of Eczema and Elevated PSA.    PSH:   has a past surgical history that includes Appendectomy and left foot surgery.    FamHx: family history includes Cancer in his father.    SocHx:  reports that he has quit smoking. He has never used smokeless tobacco. He reports current alcohol use. He reports that he does not use drugs.      Physical Exam:  Vitals:    07/23/20 1040   BP: 132/66   Temp: 98.4 °F (36.9 °C)     General: A&Ox3, no apparent distress, no deformities  Neck: No masses, normal thyroid  Lungs: normal inspiration, no use of accessory muscles  Heart: normal pulse, no arrhythmias  Abdomen: Soft, NT, ND  Skin: The skin is warm and dry. No jaundice.  Ext: No c/c/e.  :   4/12/17: Test desc priya, no abnormalities of epididymus, nontender. Penis normal, with normal penile and scrotal skin. Meatus normal. Normal rectal tone, no hemorrhoids. Prost >50 gm no nodules or masses appreciated. SV not palpable. Perineum and anus normal.      Labs/Studies:   PSA    7/29/14: 8.4    3/3/15: 9.2    4/16: 10.2    4/17: 9.3    12/19: 12.6    1/20: 11.7  Bladder Scan performed in office:     10/14: PVR 30 ml.    Impression/Plan:   1. Was doing really well, PSA elevated but consistent with history  2. BPH: continue flomax and add finasteride again  3. New ED problem -> cialis rx  4. Sims out today.  Recheck Monday.  5. Macrobid for possible UTI; will send UA/UCx.

## 2020-07-24 ENCOUNTER — CLINICAL SUPPORT (OUTPATIENT)
Dept: UROLOGY | Facility: CLINIC | Age: 75
End: 2020-07-24
Payer: MEDICARE

## 2020-07-24 ENCOUNTER — PATIENT MESSAGE (OUTPATIENT)
Dept: UROLOGY | Facility: CLINIC | Age: 75
End: 2020-07-24

## 2020-07-24 DIAGNOSIS — R33.9 URINARY RETENTION: Primary | ICD-10-CM

## 2020-07-24 LAB — POC RESIDUAL URINE VOLUME: 115 ML (ref 0–100)

## 2020-07-24 PROCEDURE — 99999 PR PBB SHADOW E&M-EST. PATIENT-LVL II: ICD-10-PCS | Mod: PBBFAC,,,

## 2020-07-24 PROCEDURE — 51798 US URINE CAPACITY MEASURE: CPT | Mod: S$GLB,,, | Performed by: UROLOGY

## 2020-07-24 PROCEDURE — 51798 POCT BLADDER SCAN: ICD-10-PCS | Mod: S$GLB,,, | Performed by: UROLOGY

## 2020-07-24 PROCEDURE — 99999 PR PBB SHADOW E&M-EST. PATIENT-LVL II: CPT | Mod: PBBFAC,,,

## 2020-07-24 NOTE — PROGRESS NOTES
Patient here for PVR after catheter removal yesterday. . Instructed patient to go to the ER if he is unable to urinate or has any issues over the weekend, call our office if he has any issues before his f/u appt on next Wednesday. He verbally understood and left clinic ambulatory.

## 2020-07-28 ENCOUNTER — TELEPHONE (OUTPATIENT)
Dept: UROLOGY | Facility: CLINIC | Age: 75
End: 2020-07-28

## 2020-07-28 NOTE — TELEPHONE ENCOUNTER
----- Message from Huey Solis sent at 7/28/2020  9:41 AM CDT -----  Regarding: Blood in Urine  Contact: Pt spouse Darling Moody called in regards to speaking with the staff regarding the pt having noticeable blood in her urine. Pt can be reached at [phone].

## 2020-07-28 NOTE — TELEPHONE ENCOUNTER
Reassured pt's wife that some blood will be expected. Advised to go to ER if significant enough. Advised to have pt increase fluid intake in the meantime. Pt's wife states she will continue to monitor pt, but will try to hold off until his appt tomorrow with Dr. Antony.

## 2020-07-29 ENCOUNTER — OFFICE VISIT (OUTPATIENT)
Dept: UROLOGY | Facility: CLINIC | Age: 75
End: 2020-07-29
Payer: MEDICARE

## 2020-07-29 ENCOUNTER — LAB VISIT (OUTPATIENT)
Dept: LAB | Facility: HOSPITAL | Age: 75
End: 2020-07-29
Attending: UROLOGY
Payer: MEDICARE

## 2020-07-29 VITALS
WEIGHT: 164 LBS | HEIGHT: 67 IN | SYSTOLIC BLOOD PRESSURE: 122 MMHG | DIASTOLIC BLOOD PRESSURE: 60 MMHG | BODY MASS INDEX: 25.74 KG/M2

## 2020-07-29 DIAGNOSIS — N40.0 BENIGN PROSTATIC HYPERPLASIA, UNSPECIFIED WHETHER LOWER URINARY TRACT SYMPTOMS PRESENT: ICD-10-CM

## 2020-07-29 DIAGNOSIS — R31.9 HEMATURIA, UNSPECIFIED TYPE: ICD-10-CM

## 2020-07-29 DIAGNOSIS — N52.9 ERECTILE DYSFUNCTION, UNSPECIFIED ERECTILE DYSFUNCTION TYPE: ICD-10-CM

## 2020-07-29 DIAGNOSIS — R31.9 HEMATURIA, UNSPECIFIED TYPE: Primary | ICD-10-CM

## 2020-07-29 LAB
BILIRUB SERPL-MCNC: NORMAL MG/DL
BLOOD URINE, POC: 250
CLARITY, POC UA: NORMAL
COLOR, POC UA: YELLOW
CREAT SERPL-MCNC: 0.8 MG/DL (ref 0.5–1.4)
EST. GFR  (AFRICAN AMERICAN): >60 ML/MIN/1.73 M^2
EST. GFR  (NON AFRICAN AMERICAN): >60 ML/MIN/1.73 M^2
GLUCOSE UR QL STRIP: NORMAL
KETONES UR QL STRIP: NORMAL
LEUKOCYTE ESTERASE URINE, POC: NORMAL
NITRITE, POC UA: NORMAL
PH, POC UA: 6
POC RESIDUAL URINE VOLUME: 104 ML (ref 0–100)
PROTEIN, POC: NORMAL
SPECIFIC GRAVITY, POC UA: 1.01
UROBILINOGEN, POC UA: NORMAL

## 2020-07-29 PROCEDURE — 51798 US URINE CAPACITY MEASURE: CPT | Mod: S$GLB,,, | Performed by: UROLOGY

## 2020-07-29 PROCEDURE — 99999 PR PBB SHADOW E&M-EST. PATIENT-LVL III: ICD-10-PCS | Mod: PBBFAC,,, | Performed by: UROLOGY

## 2020-07-29 PROCEDURE — 87077 CULTURE AEROBIC IDENTIFY: CPT

## 2020-07-29 PROCEDURE — 1159F PR MEDICATION LIST DOCUMENTED IN MEDICAL RECORD: ICD-10-PCS | Mod: S$GLB,,, | Performed by: UROLOGY

## 2020-07-29 PROCEDURE — 51798 POCT BLADDER SCAN: ICD-10-PCS | Mod: S$GLB,,, | Performed by: UROLOGY

## 2020-07-29 PROCEDURE — 1159F MED LIST DOCD IN RCRD: CPT | Mod: S$GLB,,, | Performed by: UROLOGY

## 2020-07-29 PROCEDURE — 3008F PR BODY MASS INDEX (BMI) DOCUMENTED: ICD-10-PCS | Mod: CPTII,S$GLB,, | Performed by: UROLOGY

## 2020-07-29 PROCEDURE — 99999 PR PBB SHADOW E&M-EST. PATIENT-LVL III: CPT | Mod: PBBFAC,,, | Performed by: UROLOGY

## 2020-07-29 PROCEDURE — 82565 ASSAY OF CREATININE: CPT

## 2020-07-29 PROCEDURE — 1125F AMNT PAIN NOTED PAIN PRSNT: CPT | Mod: S$GLB,,, | Performed by: UROLOGY

## 2020-07-29 PROCEDURE — 87088 URINE BACTERIA CULTURE: CPT

## 2020-07-29 PROCEDURE — 1101F PT FALLS ASSESS-DOCD LE1/YR: CPT | Mod: CPTII,S$GLB,, | Performed by: UROLOGY

## 2020-07-29 PROCEDURE — 87186 SC STD MICRODIL/AGAR DIL: CPT

## 2020-07-29 PROCEDURE — 3008F BODY MASS INDEX DOCD: CPT | Mod: CPTII,S$GLB,, | Performed by: UROLOGY

## 2020-07-29 PROCEDURE — 1125F PR PAIN SEVERITY QUANTIFIED, PAIN PRESENT: ICD-10-PCS | Mod: S$GLB,,, | Performed by: UROLOGY

## 2020-07-29 PROCEDURE — 81002 URINALYSIS NONAUTO W/O SCOPE: CPT | Mod: S$GLB,,, | Performed by: UROLOGY

## 2020-07-29 PROCEDURE — 81002 POCT URINE DIPSTICK WITHOUT MICROSCOPE: ICD-10-PCS | Mod: S$GLB,,, | Performed by: UROLOGY

## 2020-07-29 PROCEDURE — 36415 COLL VENOUS BLD VENIPUNCTURE: CPT

## 2020-07-29 PROCEDURE — 87086 URINE CULTURE/COLONY COUNT: CPT

## 2020-07-29 PROCEDURE — 99214 OFFICE O/P EST MOD 30 MIN: CPT | Mod: 25,S$GLB,, | Performed by: UROLOGY

## 2020-07-29 PROCEDURE — 1101F PR PT FALLS ASSESS DOC 0-1 FALLS W/OUT INJ PAST YR: ICD-10-PCS | Mod: CPTII,S$GLB,, | Performed by: UROLOGY

## 2020-07-29 PROCEDURE — 81001 URINALYSIS AUTO W/SCOPE: CPT

## 2020-07-29 PROCEDURE — 99214 PR OFFICE/OUTPT VISIT, EST, LEVL IV, 30-39 MIN: ICD-10-PCS | Mod: 25,S$GLB,, | Performed by: UROLOGY

## 2020-07-29 NOTE — PROGRESS NOTES
Chief Complaint: Elevated PSA    HPI:   7/29/20: Martinez out last week, stream is weak sometimes, others okay.  Macrobid complete.  Hematuria yesterday.  7/23/20: Stopped the flomax and never really started the finasteride and went into retention last week.  Flomax daily since martinez placed in ER.  Also taking the finasteride.  1/8/20: Asks about ED meds; discussed.  Emptying well now better than a year ago. Reviewed history in detail. Not sure he started finasteride.   1/2/19: No sig problems.  Feeling fine.  Had an episode of mild retention and went to the ER where he was given flomax and he voids better on it.  Takes it every 2d or so.  4/12/17: No problems at all.  4/11/16: No PSA done will order today.  Doing well.  Says that for the last four months when he drinks a bunch of beer he gets bilateral flank pain. No hematuria.  3/4/15: PSA essentially unchanged.  Oxybutinin and caffeine cessation have improved OAB but not even needing the oxybutinin lately.  10/10/14: Pt states that after a couple of beers his testicles start to get a dull pain and he also has some SP pressure before voiding.  Then he voids and it is relieved.  Fine during the day.  Really a problem when he drinks too much.  Good stream.  Doesn't hold too long.  Drinks coffee in AM, coke or two during the day, VO/coke,   9/3/14: Pt doing well from cyst excision.  PSA 8.4 on recheck after 14d cipro for PSA 10.  Left testicle the same.  7/9/14: 69 yo man has a dull left testicular pain when he goes to bed but not during most of the day.  Keeps him from sleeping a little bit.  No hematuria.  No urolithiasis history.  Saw Dr. Haskins and another urologist for BPH and elevated PSA.  Had two negative prostate biopsies last 4 years ago and he was advised to have a third and he declined.  No urinary bother.  No constipation.  No hernias or related surgeries.    Allergies:  Eucrisa [crisaborole]    Medications:  has a current medication list which includes the  following prescription(s): doxepin, doxycycline, finasteride, halobetasol, levocetirizine, mometasone 0.1%, mupirocin, nitrofurantoin, tadalafil, and tamsulosin.    Review of Systems:  General: No fever, chills, fatigability, or weight loss.  Skin: No rashes, itching, or changes in color or texture of skin.  Chest: Denies AQUINO, cyanosis, wheezing, cough, and sputum production.  Abdomen: Appetite fine. No weight loss. Denies diarrhea, abdominal pain, hematemesis, or blood in stool.  Musculoskeletal: No joint stiffness or swelling. Some back pain.  : As above.  All other review of systems negative.    PMH:   has a past medical history of Eczema and Elevated PSA.    PSH:   has a past surgical history that includes Appendectomy and left foot surgery.    FamHx: family history includes Cancer in his father.    SocHx:  reports that he has quit smoking. He has never used smokeless tobacco. He reports current alcohol use. He reports that he does not use drugs.      Physical Exam:  Vitals:    07/29/20 0948   BP: 122/60     General: A&Ox3, no apparent distress, no deformities  Neck: No masses, normal thyroid  Lungs: normal inspiration, no use of accessory muscles  Heart: normal pulse, no arrhythmias  Abdomen: Soft, NT, ND  Skin: The skin is warm and dry. No jaundice.  Ext: No c/c/e.  :   4/12/17: Test desc priya, no abnormalities of epididymus, nontender. Penis normal, with normal penile and scrotal skin. Meatus normal. Normal rectal tone, no hemorrhoids. Prost >50 gm no nodules or masses appreciated. SV not palpable. Perineum and anus normal.      Labs/Studies:   PSA    7/29/14: 8.4    3/3/15: 9.2    4/16: 10.2    4/17: 9.3    12/19: 12.6    1/20: 11.7  Bladder Scan performed in office:     10/14: PVR 30 ml.    Impression/Plan:   1. Was doing really well, PSA elevated but consistent with history.  2. BPH: continue flomax/finasteride again  3. Hematuria persists with LUTS now that martinez is out.  CT Urogram and RTC cysto.  4.  New ED problem -> cialis rx

## 2020-07-30 LAB
BACTERIA #/AREA URNS AUTO: ABNORMAL /HPF
MICROSCOPIC COMMENT: ABNORMAL
RBC #/AREA URNS AUTO: >100 /HPF (ref 0–4)
WBC #/AREA URNS AUTO: >100 /HPF (ref 0–5)
WBC CLUMPS UR QL AUTO: ABNORMAL

## 2020-07-31 ENCOUNTER — TELEPHONE (OUTPATIENT)
Dept: UROLOGY | Facility: CLINIC | Age: 75
End: 2020-07-31

## 2020-07-31 NOTE — TELEPHONE ENCOUNTER
----- Message from Viktoria Mullins sent at 7/31/2020 10:27 AM CDT -----  Regarding: upcoming appointment  Contact: Patient's wife- Darling  Please call concerning patient's problem urinating at night at Ph .[phone] ( Darling)

## 2020-08-01 LAB — BACTERIA UR CULT: ABNORMAL

## 2020-08-03 ENCOUNTER — TELEPHONE (OUTPATIENT)
Dept: UROLOGY | Facility: CLINIC | Age: 75
End: 2020-08-03

## 2020-08-03 RX ORDER — CIPROFLOXACIN 500 MG/1
500 TABLET ORAL 2 TIMES DAILY
Qty: 14 TABLET | Refills: 0 | Status: SHIPPED | OUTPATIENT
Start: 2020-08-03 | End: 2020-08-10

## 2020-08-07 ENCOUNTER — HOSPITAL ENCOUNTER (OUTPATIENT)
Dept: RADIOLOGY | Facility: HOSPITAL | Age: 75
Discharge: HOME | End: 2020-08-07
Attending: UROLOGY
Payer: MEDICARE

## 2020-08-07 DIAGNOSIS — N52.9 ERECTILE DYSFUNCTION, UNSPECIFIED ERECTILE DYSFUNCTION TYPE: ICD-10-CM

## 2020-08-07 DIAGNOSIS — N40.0 BENIGN PROSTATIC HYPERPLASIA, UNSPECIFIED WHETHER LOWER URINARY TRACT SYMPTOMS PRESENT: ICD-10-CM

## 2020-08-07 DIAGNOSIS — R31.9 HEMATURIA, UNSPECIFIED TYPE: ICD-10-CM

## 2020-08-07 PROCEDURE — 25500020 PHARM REV CODE 255: Performed by: UROLOGY

## 2020-08-07 PROCEDURE — 74178 CT ABD&PLV WO CNTR FLWD CNTR: CPT | Mod: TC

## 2020-08-07 RX ADMIN — IOHEXOL 100 ML: 350 INJECTION, SOLUTION INTRAVENOUS at 01:08

## 2020-08-26 ENCOUNTER — HOSPITAL ENCOUNTER (OUTPATIENT)
Dept: CARDIOLOGY | Facility: HOSPITAL | Age: 75
Discharge: HOME | End: 2020-08-26
Attending: UROLOGY
Payer: MEDICARE

## 2020-08-26 ENCOUNTER — OFFICE VISIT (OUTPATIENT)
Dept: UROLOGY | Facility: CLINIC | Age: 75
End: 2020-08-26
Payer: MEDICARE

## 2020-08-26 ENCOUNTER — HOSPITAL ENCOUNTER (OUTPATIENT)
Dept: RADIOLOGY | Facility: HOSPITAL | Age: 75
Discharge: HOME | End: 2020-08-26
Attending: UROLOGY
Payer: MEDICARE

## 2020-08-26 VITALS — TEMPERATURE: 97 F | BODY MASS INDEX: 25.69 KG/M2 | WEIGHT: 164 LBS

## 2020-08-26 DIAGNOSIS — Z01.818 PRE-OP TESTING: ICD-10-CM

## 2020-08-26 DIAGNOSIS — N40.0 BENIGN PROSTATIC HYPERPLASIA, UNSPECIFIED WHETHER LOWER URINARY TRACT SYMPTOMS PRESENT: Primary | ICD-10-CM

## 2020-08-26 DIAGNOSIS — Z03.818 ENCOUNTER FOR OBSERVATION FOR SUSPECTED EXPOSURE TO OTHER BIOLOGICAL AGENTS RULED OUT: ICD-10-CM

## 2020-08-26 DIAGNOSIS — N40.0 BENIGN PROSTATIC HYPERPLASIA, UNSPECIFIED WHETHER LOWER URINARY TRACT SYMPTOMS PRESENT: ICD-10-CM

## 2020-08-26 DIAGNOSIS — Z01.818 PRE-OP EVALUATION: ICD-10-CM

## 2020-08-26 LAB
BILIRUB SERPL-MCNC: NORMAL MG/DL
BLOOD URINE, POC: NORMAL
CLARITY, POC UA: NORMAL
COLOR, POC UA: YELLOW
GLUCOSE UR QL STRIP: NORMAL
KETONES UR QL STRIP: NORMAL
LEUKOCYTE ESTERASE URINE, POC: NORMAL
NITRITE, POC UA: NORMAL
PH, POC UA: 5
PROTEIN, POC: NORMAL
SPECIFIC GRAVITY, POC UA: 1.01
UROBILINOGEN, POC UA: NORMAL

## 2020-08-26 PROCEDURE — 99499 UNLISTED E&M SERVICE: CPT | Mod: S$GLB,,, | Performed by: UROLOGY

## 2020-08-26 PROCEDURE — 93005 ELECTROCARDIOGRAM TRACING: CPT

## 2020-08-26 PROCEDURE — 93010 EKG 12-LEAD: ICD-10-PCS | Mod: ,,, | Performed by: INTERNAL MEDICINE

## 2020-08-26 PROCEDURE — 76872 PR US TRANSRECTAL: ICD-10-PCS | Mod: 26,S$GLB,, | Performed by: UROLOGY

## 2020-08-26 PROCEDURE — 76872 US TRANSRECTAL: CPT | Mod: 26,S$GLB,, | Performed by: UROLOGY

## 2020-08-26 PROCEDURE — 71046 X-RAY EXAM CHEST 2 VIEWS: CPT | Mod: 26,,, | Performed by: RADIOLOGY

## 2020-08-26 PROCEDURE — 99999 PR PBB SHADOW E&M-EST. PATIENT-LVL IV: CPT | Mod: PBBFAC,,, | Performed by: UROLOGY

## 2020-08-26 PROCEDURE — 99499 NO LOS: ICD-10-PCS | Mod: S$GLB,,, | Performed by: UROLOGY

## 2020-08-26 PROCEDURE — 93010 ELECTROCARDIOGRAM REPORT: CPT | Mod: ,,, | Performed by: INTERNAL MEDICINE

## 2020-08-26 PROCEDURE — 81002 POCT URINE DIPSTICK WITHOUT MICROSCOPE: ICD-10-PCS | Mod: S$GLB,,, | Performed by: UROLOGY

## 2020-08-26 PROCEDURE — 52000 CYSTOURETHROSCOPY: CPT | Mod: S$GLB,,, | Performed by: UROLOGY

## 2020-08-26 PROCEDURE — 71046 X-RAY EXAM CHEST 2 VIEWS: CPT | Mod: TC

## 2020-08-26 PROCEDURE — 52000 PR CYSTOURETHROSCOPY: ICD-10-PCS | Mod: S$GLB,,, | Performed by: UROLOGY

## 2020-08-26 PROCEDURE — 99999 PR PBB SHADOW E&M-EST. PATIENT-LVL IV: ICD-10-PCS | Mod: PBBFAC,,, | Performed by: UROLOGY

## 2020-08-26 PROCEDURE — 81002 URINALYSIS NONAUTO W/O SCOPE: CPT | Mod: S$GLB,,, | Performed by: UROLOGY

## 2020-08-26 PROCEDURE — 71046 XR CHEST PA AND LATERAL: ICD-10-PCS | Mod: 26,,, | Performed by: RADIOLOGY

## 2020-08-26 NOTE — PROGRESS NOTES
Patient has been scheduled to have a Simple Prostatectomy. Pre-op orders placed and scheduled.  Pt informed that [place] [facility] type and screen.  Pt also informed that he would have to have his Covid test done 72 hours prior to surgery.  Surgery instructions reviewed with pt, including bowel prep and clear liquids the day prior to surgery. Pt verbalized understanding.

## 2020-08-26 NOTE — PROGRESS NOTES
Chief Complaint: Elevated PSA    HPI:   8/26/20: Cysto today confirms severe BPH.  CT Urogram shows sig BPH. Prostate is measured at 97 gm, likely a little bigger allowing for technique.  7/29/20: Martinez out last week, stream is weak sometimes, others okay.  Macrobid complete.  Hematuria yesterday.  7/23/20: Stopped the flomax and never really started the finasteride and went into retention last week.  Flomax daily since martinez placed in ER.  Also taking the finasteride.  1/8/20: Asks about ED meds; discussed.  Emptying well now better than a year ago. Reviewed history in detail. Not sure he started finasteride.   1/2/19: No sig problems.  Feeling fine.  Had an episode of mild retention and went to the ER where he was given flomax and he voids better on it.  Takes it every 2d or so.  4/12/17: No problems at all.  4/11/16: No PSA done will order today.  Doing well.  Says that for the last four months when he drinks a bunch of beer he gets bilateral flank pain. No hematuria.  3/4/15: PSA essentially unchanged.  Oxybutinin and caffeine cessation have improved OAB but not even needing the oxybutinin lately.  10/10/14: Pt states that after a couple of beers his testicles start to get a dull pain and he also has some SP pressure before voiding.  Then he voids and it is relieved.  Fine during the day.  Really a problem when he drinks too much.  Good stream.  Doesn't hold too long.  Drinks coffee in AM, coke or two during the day, VO/coke,   9/3/14: Pt doing well from cyst excision.  PSA 8.4 on recheck after 14d cipro for PSA 10.  Left testicle the same.  7/9/14: 69 yo man has a dull left testicular pain when he goes to bed but not during most of the day.  Keeps him from sleeping a little bit.  No hematuria.  No urolithiasis history.  Saw Dr. Haskins and another urologist for BPH and elevated PSA.  Had two negative prostate biopsies last 4 years ago and he was advised to have a third and he declined.  No urinary bother.  No  constipation.  No hernias or related surgeries.    Allergies:  Eucrisa [crisaborole]    Medications:  has a current medication list which includes the following prescription(s): doxepin, doxycycline, finasteride, halobetasol, levocetirizine, mometasone 0.1%, mupirocin, tadalafil, and tamsulosin.    Review of Systems:  General: No fever, chills, fatigability, or weight loss.  Skin: No rashes, itching, or changes in color or texture of skin.  Chest: Denies AQUINO, cyanosis, wheezing, cough, and sputum production.  Abdomen: Appetite fine. No weight loss. Denies diarrhea, abdominal pain, hematemesis, or blood in stool.  Musculoskeletal: No joint stiffness or swelling. Some back pain.  : As above.  All other review of systems negative.    PMH:   has a past medical history of Eczema and Elevated PSA.    PSH:   has a past surgical history that includes Appendectomy and left foot surgery.    FamHx: family history includes Cancer in his father.    SocHx:  reports that he has quit smoking. He has never used smokeless tobacco. He reports current alcohol use. He reports that he does not use drugs.      Physical Exam:  There were no vitals filed for this visit.  General: A&Ox3, no apparent distress, no deformities  Neck: No masses, normal thyroid  Lungs: normal inspiration, no use of accessory muscles  Heart: normal pulse, no arrhythmias  Abdomen: Soft, NT, ND  Skin: The skin is warm and dry. No jaundice.  Ext: No c/c/e.  :   4/12/17: Test desc priya, no abnormalities of epididymus, nontender. Penis normal, with normal penile and scrotal skin. Meatus normal. Normal rectal tone, no hemorrhoids. Prost >50 gm no nodules or masses appreciated. SV not palpable. Perineum and anus normal.      Labs/Studies:   PSA    7/29/14: 8.4    3/3/15: 9.2    4/16: 10.2    4/17: 9.3    12/19: 12.6    1/20: 11.7  Bladder Scan performed in office:     10/14: PVR 30 ml.    Procedure: Diagnostic Cystoscopy    Procedure in Detail: After proper consents  were obtained, the patient was prepped and draped in normal sterile fashion for diagnostic cystoscopy. 5 ml of lidocaine jelly was instilled in the urethra. The flexible cystoscope was then introduced into the urethra, and advanced into the bladder under direct vision. The urethral mucosa appeared normal, and no strictures were noted. The sphincter appeared to be normal, and the veru montanum was unremarkable. The prostatic mucosa and the lateral lobes of the prostate were opposed and obstructing; very large bladder neck from BPH. The bladder neck was normal. Inspection of the interior of the bladder was then carried out. The trigone was unremarkable, with no mucosal lesions. The ureteral orifices were normal in position and configuration. Systematic inspection of the mucosa of the bladder it was then carried out, rotating the cystoscope so that all areas of the left and right lateral walls, the dome of the bladder, and the posterior wall were all visualized. The cystoscope was then advanced further into the bladder, and maximum deflection of the scope was performed so that the bladder neck could be inspected. No mucosal lesions were noted there. The cystoscope was then removed, and the procedure terminated.     Findings: BPH    Study performed:    1. U/S of Prostate     Indication: BPH    Procedure: The patient was positioned in the left lateral decubitus with legs drawn toward the chest. The transrectal ultrasound was inserted in the rectum with appropriate lubrication and local anesthesia using viscous lidocaine. Longitudinal and transverse survey of the prostate was performed.  Prostate mid-sagittal and mid-transverse views were made at the apex, base and middle with volume, height, width, and length measurements.  Right and left lateral views were captured.  The wall along the prostate was examined.  Seminal vesicle width and length were evaluated bilaterally. Any abnormalities are in the findings  section.    Findings: The prostate is 61 (w) x 50 (h) x 62 (l) mm for volume of 97 ml. SV normal size and position.  No other abnormalities appreciated.    Impression/Plan:   1. Was doing really well, PSA elevated but consistent with history.  2. BPH: continue flomax/finasteride pt desires surgical mgmt -> RA Simple prostatectomy.  Risks/benefits reviewed consents on chart.  3. ED: cialis

## 2020-09-01 ENCOUNTER — TELEPHONE (OUTPATIENT)
Dept: UROLOGY | Facility: CLINIC | Age: 75
End: 2020-09-01

## 2020-09-01 NOTE — TELEPHONE ENCOUNTER
----- Message from Jeanna Fung sent at 9/1/2020  9:26 AM CDT -----  Regarding: ky  Contact: patient  Patient needs to change the date of his procedure, please call him back at [phone]
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
## 2020-10-06 ENCOUNTER — PATIENT MESSAGE (OUTPATIENT)
Dept: ADMINISTRATIVE | Facility: HOSPITAL | Age: 75
End: 2020-10-06

## 2021-02-08 ENCOUNTER — OFFICE VISIT (OUTPATIENT)
Dept: UROLOGY | Facility: CLINIC | Age: 76
End: 2021-02-08
Payer: MEDICARE

## 2021-02-08 ENCOUNTER — LAB VISIT (OUTPATIENT)
Dept: LAB | Facility: HOSPITAL | Age: 76
End: 2021-02-08
Attending: UROLOGY
Payer: MEDICARE

## 2021-02-08 ENCOUNTER — TELEPHONE (OUTPATIENT)
Dept: UROLOGY | Facility: CLINIC | Age: 76
End: 2021-02-08

## 2021-02-08 VITALS
DIASTOLIC BLOOD PRESSURE: 82 MMHG | BODY MASS INDEX: 26.53 KG/M2 | WEIGHT: 169 LBS | HEIGHT: 67 IN | SYSTOLIC BLOOD PRESSURE: 130 MMHG

## 2021-02-08 DIAGNOSIS — N40.0 BENIGN PROSTATIC HYPERPLASIA, UNSPECIFIED WHETHER LOWER URINARY TRACT SYMPTOMS PRESENT: ICD-10-CM

## 2021-02-08 DIAGNOSIS — N40.0 BENIGN PROSTATIC HYPERPLASIA, UNSPECIFIED WHETHER LOWER URINARY TRACT SYMPTOMS PRESENT: Primary | ICD-10-CM

## 2021-02-08 DIAGNOSIS — N52.9 ERECTILE DYSFUNCTION, UNSPECIFIED ERECTILE DYSFUNCTION TYPE: ICD-10-CM

## 2021-02-08 DIAGNOSIS — Z12.5 PROSTATE CANCER SCREENING: ICD-10-CM

## 2021-02-08 DIAGNOSIS — R97.20 ELEVATED PSA: ICD-10-CM

## 2021-02-08 LAB
BILIRUB SERPL-MCNC: NORMAL MG/DL
BLOOD URINE, POC: NORMAL
CLARITY, POC UA: CLEAR
COLOR, POC UA: YELLOW
COMPLEXED PSA SERPL-MCNC: 4.9 NG/ML (ref 0–4)
GLUCOSE UR QL STRIP: NORMAL
KETONES UR QL STRIP: NORMAL
LEUKOCYTE ESTERASE URINE, POC: NORMAL
NITRITE, POC UA: NORMAL
PH, POC UA: 6
POC RESIDUAL URINE VOLUME: 23 ML (ref 0–100)
PROTEIN, POC: NORMAL
SPECIFIC GRAVITY, POC UA: 1.01
UROBILINOGEN, POC UA: NORMAL

## 2021-02-08 PROCEDURE — 3288F PR FALLS RISK ASSESSMENT DOCUMENTED: ICD-10-PCS | Mod: CPTII,S$GLB,, | Performed by: UROLOGY

## 2021-02-08 PROCEDURE — 36415 COLL VENOUS BLD VENIPUNCTURE: CPT

## 2021-02-08 PROCEDURE — 3008F PR BODY MASS INDEX (BMI) DOCUMENTED: ICD-10-PCS | Mod: CPTII,S$GLB,, | Performed by: UROLOGY

## 2021-02-08 PROCEDURE — 3008F BODY MASS INDEX DOCD: CPT | Mod: CPTII,S$GLB,, | Performed by: UROLOGY

## 2021-02-08 PROCEDURE — 1125F AMNT PAIN NOTED PAIN PRSNT: CPT | Mod: S$GLB,,, | Performed by: UROLOGY

## 2021-02-08 PROCEDURE — 1125F PR PAIN SEVERITY QUANTIFIED, PAIN PRESENT: ICD-10-PCS | Mod: S$GLB,,, | Performed by: UROLOGY

## 2021-02-08 PROCEDURE — 1101F PR PT FALLS ASSESS DOC 0-1 FALLS W/OUT INJ PAST YR: ICD-10-PCS | Mod: CPTII,S$GLB,, | Performed by: UROLOGY

## 2021-02-08 PROCEDURE — 1159F PR MEDICATION LIST DOCUMENTED IN MEDICAL RECORD: ICD-10-PCS | Mod: S$GLB,,, | Performed by: UROLOGY

## 2021-02-08 PROCEDURE — 99214 OFFICE O/P EST MOD 30 MIN: CPT | Mod: 25,S$GLB,, | Performed by: UROLOGY

## 2021-02-08 PROCEDURE — 51798 POCT BLADDER SCAN: ICD-10-PCS | Mod: S$GLB,,, | Performed by: UROLOGY

## 2021-02-08 PROCEDURE — 99999 PR PBB SHADOW E&M-EST. PATIENT-LVL III: CPT | Mod: PBBFAC,,, | Performed by: UROLOGY

## 2021-02-08 PROCEDURE — 81002 URINALYSIS NONAUTO W/O SCOPE: CPT | Mod: S$GLB,,, | Performed by: UROLOGY

## 2021-02-08 PROCEDURE — 1101F PT FALLS ASSESS-DOCD LE1/YR: CPT | Mod: CPTII,S$GLB,, | Performed by: UROLOGY

## 2021-02-08 PROCEDURE — 99999 PR PBB SHADOW E&M-EST. PATIENT-LVL III: ICD-10-PCS | Mod: PBBFAC,,, | Performed by: UROLOGY

## 2021-02-08 PROCEDURE — 84153 ASSAY OF PSA TOTAL: CPT

## 2021-02-08 PROCEDURE — 51798 US URINE CAPACITY MEASURE: CPT | Mod: S$GLB,,, | Performed by: UROLOGY

## 2021-02-08 PROCEDURE — 81002 POCT URINE DIPSTICK WITHOUT MICROSCOPE: ICD-10-PCS | Mod: S$GLB,,, | Performed by: UROLOGY

## 2021-02-08 PROCEDURE — 3288F FALL RISK ASSESSMENT DOCD: CPT | Mod: CPTII,S$GLB,, | Performed by: UROLOGY

## 2021-02-08 PROCEDURE — 99214 PR OFFICE/OUTPT VISIT, EST, LEVL IV, 30-39 MIN: ICD-10-PCS | Mod: 25,S$GLB,, | Performed by: UROLOGY

## 2021-02-08 PROCEDURE — 1159F MED LIST DOCD IN RCRD: CPT | Mod: S$GLB,,, | Performed by: UROLOGY

## 2021-02-09 ENCOUNTER — PATIENT MESSAGE (OUTPATIENT)
Dept: UROLOGY | Facility: CLINIC | Age: 76
End: 2021-02-09

## 2021-02-15 ENCOUNTER — PATIENT MESSAGE (OUTPATIENT)
Dept: ORTHOPEDICS | Facility: CLINIC | Age: 76
End: 2021-02-15

## 2021-02-18 ENCOUNTER — TELEPHONE (OUTPATIENT)
Dept: ORTHOPEDICS | Facility: CLINIC | Age: 76
End: 2021-02-18

## 2021-02-25 RX ORDER — FINASTERIDE 5 MG/1
5 TABLET, FILM COATED ORAL DAILY
Qty: 90 TABLET | Refills: 3 | Status: SHIPPED | OUTPATIENT
Start: 2021-02-25 | End: 2021-08-12 | Stop reason: SDUPTHER

## 2021-02-25 RX ORDER — TAMSULOSIN HYDROCHLORIDE 0.4 MG/1
1 CAPSULE ORAL DAILY
Qty: 90 CAPSULE | Refills: 3 | Status: SHIPPED | OUTPATIENT
Start: 2021-02-25 | End: 2021-08-12 | Stop reason: SDUPTHER

## 2021-04-28 ENCOUNTER — PATIENT MESSAGE (OUTPATIENT)
Dept: RESEARCH | Facility: HOSPITAL | Age: 76
End: 2021-04-28

## 2021-05-05 ENCOUNTER — TELEPHONE (OUTPATIENT)
Dept: UROLOGY | Facility: CLINIC | Age: 76
End: 2021-05-05

## 2021-07-06 ENCOUNTER — TELEPHONE (OUTPATIENT)
Dept: DERMATOLOGY | Facility: CLINIC | Age: 76
End: 2021-07-06

## 2021-08-05 ENCOUNTER — LAB VISIT (OUTPATIENT)
Dept: LAB | Facility: HOSPITAL | Age: 76
End: 2021-08-05
Attending: UROLOGY
Payer: MEDICARE

## 2021-08-05 DIAGNOSIS — Z12.5 PROSTATE CANCER SCREENING: ICD-10-CM

## 2021-08-05 DIAGNOSIS — R97.20 ELEVATED PSA: ICD-10-CM

## 2021-08-05 DIAGNOSIS — N40.0 BENIGN PROSTATIC HYPERPLASIA, UNSPECIFIED WHETHER LOWER URINARY TRACT SYMPTOMS PRESENT: ICD-10-CM

## 2021-08-05 DIAGNOSIS — N52.9 ERECTILE DYSFUNCTION, UNSPECIFIED ERECTILE DYSFUNCTION TYPE: ICD-10-CM

## 2021-08-05 LAB — COMPLEXED PSA SERPL-MCNC: 6 NG/ML (ref 0–4)

## 2021-08-05 PROCEDURE — 36415 COLL VENOUS BLD VENIPUNCTURE: CPT | Performed by: UROLOGY

## 2021-08-05 PROCEDURE — 84153 ASSAY OF PSA TOTAL: CPT | Performed by: UROLOGY

## 2021-08-12 ENCOUNTER — OFFICE VISIT (OUTPATIENT)
Dept: UROLOGY | Facility: CLINIC | Age: 76
End: 2021-08-12
Payer: MEDICARE

## 2021-08-12 VITALS
BODY MASS INDEX: 26.31 KG/M2 | HEART RATE: 76 BPM | WEIGHT: 168 LBS | DIASTOLIC BLOOD PRESSURE: 84 MMHG | SYSTOLIC BLOOD PRESSURE: 128 MMHG

## 2021-08-12 DIAGNOSIS — N52.9 ERECTILE DYSFUNCTION, UNSPECIFIED ERECTILE DYSFUNCTION TYPE: ICD-10-CM

## 2021-08-12 DIAGNOSIS — R39.12 BENIGN PROSTATIC HYPERPLASIA WITH WEAK URINARY STREAM: ICD-10-CM

## 2021-08-12 DIAGNOSIS — N40.1 BENIGN PROSTATIC HYPERPLASIA WITH WEAK URINARY STREAM: ICD-10-CM

## 2021-08-12 DIAGNOSIS — R97.20 ELEVATED PSA: Primary | ICD-10-CM

## 2021-08-12 PROCEDURE — 1159F PR MEDICATION LIST DOCUMENTED IN MEDICAL RECORD: ICD-10-PCS | Mod: CPTII,S$GLB,, | Performed by: UROLOGY

## 2021-08-12 PROCEDURE — 99999 PR PBB SHADOW E&M-EST. PATIENT-LVL III: ICD-10-PCS | Mod: PBBFAC,,, | Performed by: UROLOGY

## 2021-08-12 PROCEDURE — 3074F PR MOST RECENT SYSTOLIC BLOOD PRESSURE < 130 MM HG: ICD-10-PCS | Mod: CPTII,S$GLB,, | Performed by: UROLOGY

## 2021-08-12 PROCEDURE — 1126F AMNT PAIN NOTED NONE PRSNT: CPT | Mod: CPTII,S$GLB,, | Performed by: UROLOGY

## 2021-08-12 PROCEDURE — 1126F PR PAIN SEVERITY QUANTIFIED, NO PAIN PRESENT: ICD-10-PCS | Mod: CPTII,S$GLB,, | Performed by: UROLOGY

## 2021-08-12 PROCEDURE — 99214 OFFICE O/P EST MOD 30 MIN: CPT | Mod: S$GLB,,, | Performed by: UROLOGY

## 2021-08-12 PROCEDURE — 1160F PR REVIEW ALL MEDS BY PRESCRIBER/CLIN PHARMACIST DOCUMENTED: ICD-10-PCS | Mod: CPTII,S$GLB,, | Performed by: UROLOGY

## 2021-08-12 PROCEDURE — 99999 PR PBB SHADOW E&M-EST. PATIENT-LVL III: CPT | Mod: PBBFAC,,, | Performed by: UROLOGY

## 2021-08-12 PROCEDURE — 99214 PR OFFICE/OUTPT VISIT, EST, LEVL IV, 30-39 MIN: ICD-10-PCS | Mod: S$GLB,,, | Performed by: UROLOGY

## 2021-08-12 PROCEDURE — 1159F MED LIST DOCD IN RCRD: CPT | Mod: CPTII,S$GLB,, | Performed by: UROLOGY

## 2021-08-12 PROCEDURE — 1160F RVW MEDS BY RX/DR IN RCRD: CPT | Mod: CPTII,S$GLB,, | Performed by: UROLOGY

## 2021-08-12 PROCEDURE — 3079F PR MOST RECENT DIASTOLIC BLOOD PRESSURE 80-89 MM HG: ICD-10-PCS | Mod: CPTII,S$GLB,, | Performed by: UROLOGY

## 2021-08-12 PROCEDURE — 3074F SYST BP LT 130 MM HG: CPT | Mod: CPTII,S$GLB,, | Performed by: UROLOGY

## 2021-08-12 PROCEDURE — 81002 POCT URINE DIPSTICK WITHOUT MICROSCOPE: ICD-10-PCS | Mod: S$GLB,,, | Performed by: UROLOGY

## 2021-08-12 PROCEDURE — 3079F DIAST BP 80-89 MM HG: CPT | Mod: CPTII,S$GLB,, | Performed by: UROLOGY

## 2021-08-12 PROCEDURE — 81002 URINALYSIS NONAUTO W/O SCOPE: CPT | Mod: S$GLB,,, | Performed by: UROLOGY

## 2021-08-12 RX ORDER — FINASTERIDE 5 MG/1
5 TABLET, FILM COATED ORAL DAILY
Qty: 90 TABLET | Refills: 3 | Status: SHIPPED | OUTPATIENT
Start: 2021-08-12 | End: 2022-08-17 | Stop reason: SDUPTHER

## 2021-08-12 RX ORDER — TADALAFIL 20 MG/1
20 TABLET ORAL DAILY
Qty: 10 TABLET | Refills: 11 | Status: SHIPPED | OUTPATIENT
Start: 2021-08-12 | End: 2022-08-12

## 2021-08-12 RX ORDER — TAMSULOSIN HYDROCHLORIDE 0.4 MG/1
1 CAPSULE ORAL DAILY
Qty: 90 CAPSULE | Refills: 3 | Status: SHIPPED | OUTPATIENT
Start: 2021-08-12 | End: 2022-08-17 | Stop reason: SDUPTHER

## 2021-08-13 LAB
BILIRUB SERPL-MCNC: NORMAL MG/DL
BLOOD URINE, POC: NORMAL
CLARITY, POC UA: CLEAR
COLOR, POC UA: YELLOW
GLUCOSE UR QL STRIP: NORMAL
KETONES UR QL STRIP: NORMAL
LEUKOCYTE ESTERASE URINE, POC: NORMAL
NITRITE, POC UA: NORMAL
PH, POC UA: 5
PROTEIN, POC: NORMAL
SPECIFIC GRAVITY, POC UA: 1.02
UROBILINOGEN, POC UA: NORMAL

## 2022-01-20 ENCOUNTER — PES CALL (OUTPATIENT)
Dept: ADMINISTRATIVE | Facility: CLINIC | Age: 77
End: 2022-01-20
Payer: COMMERCIAL

## 2022-04-18 NOTE — TELEPHONE ENCOUNTER
He is on atorvastatin 10 mg. Most recent LDL 42 mg%, HDL 38, triglycerides 101 labs September 2021.    Patient was contacted and informed him of positive UTI and that antibiotics were sent to his pharmacy for . Pt verbalized understanding.

## 2022-04-19 ENCOUNTER — TELEPHONE (OUTPATIENT)
Dept: UROLOGY | Facility: CLINIC | Age: 77
End: 2022-04-19
Payer: COMMERCIAL

## 2022-05-05 ENCOUNTER — OFFICE VISIT (OUTPATIENT)
Dept: UROLOGY | Facility: CLINIC | Age: 77
End: 2022-05-05
Payer: COMMERCIAL

## 2022-05-05 VITALS
BODY MASS INDEX: 25.02 KG/M2 | HEART RATE: 77 BPM | DIASTOLIC BLOOD PRESSURE: 79 MMHG | SYSTOLIC BLOOD PRESSURE: 120 MMHG | WEIGHT: 159.75 LBS | TEMPERATURE: 98 F

## 2022-05-05 DIAGNOSIS — R97.20 ELEVATED PSA: Primary | ICD-10-CM

## 2022-05-05 PROCEDURE — 3288F PR FALLS RISK ASSESSMENT DOCUMENTED: ICD-10-PCS | Mod: HCNC,CPTII,S$GLB, | Performed by: UROLOGY

## 2022-05-05 PROCEDURE — 1101F PR PT FALLS ASSESS DOC 0-1 FALLS W/OUT INJ PAST YR: ICD-10-PCS | Mod: HCNC,CPTII,S$GLB, | Performed by: UROLOGY

## 2022-05-05 PROCEDURE — 3074F PR MOST RECENT SYSTOLIC BLOOD PRESSURE < 130 MM HG: ICD-10-PCS | Mod: HCNC,CPTII,S$GLB, | Performed by: UROLOGY

## 2022-05-05 PROCEDURE — 1159F PR MEDICATION LIST DOCUMENTED IN MEDICAL RECORD: ICD-10-PCS | Mod: HCNC,CPTII,S$GLB, | Performed by: UROLOGY

## 2022-05-05 PROCEDURE — 3074F SYST BP LT 130 MM HG: CPT | Mod: HCNC,CPTII,S$GLB, | Performed by: UROLOGY

## 2022-05-05 PROCEDURE — 3078F PR MOST RECENT DIASTOLIC BLOOD PRESSURE < 80 MM HG: ICD-10-PCS | Mod: HCNC,CPTII,S$GLB, | Performed by: UROLOGY

## 2022-05-05 PROCEDURE — 3288F FALL RISK ASSESSMENT DOCD: CPT | Mod: HCNC,CPTII,S$GLB, | Performed by: UROLOGY

## 2022-05-05 PROCEDURE — 99214 OFFICE O/P EST MOD 30 MIN: CPT | Mod: HCNC,S$GLB,, | Performed by: UROLOGY

## 2022-05-05 PROCEDURE — 99999 PR PBB SHADOW E&M-EST. PATIENT-LVL III: ICD-10-PCS | Mod: PBBFAC,HCNC,, | Performed by: UROLOGY

## 2022-05-05 PROCEDURE — 1159F MED LIST DOCD IN RCRD: CPT | Mod: HCNC,CPTII,S$GLB, | Performed by: UROLOGY

## 2022-05-05 PROCEDURE — 99999 PR PBB SHADOW E&M-EST. PATIENT-LVL III: CPT | Mod: PBBFAC,HCNC,, | Performed by: UROLOGY

## 2022-05-05 PROCEDURE — 99214 PR OFFICE/OUTPT VISIT, EST, LEVL IV, 30-39 MIN: ICD-10-PCS | Mod: HCNC,S$GLB,, | Performed by: UROLOGY

## 2022-05-05 PROCEDURE — 1101F PT FALLS ASSESS-DOCD LE1/YR: CPT | Mod: HCNC,CPTII,S$GLB, | Performed by: UROLOGY

## 2022-05-05 PROCEDURE — 3078F DIAST BP <80 MM HG: CPT | Mod: HCNC,CPTII,S$GLB, | Performed by: UROLOGY

## 2022-05-05 PROCEDURE — 1160F PR REVIEW ALL MEDS BY PRESCRIBER/CLIN PHARMACIST DOCUMENTED: ICD-10-PCS | Mod: HCNC,CPTII,S$GLB, | Performed by: UROLOGY

## 2022-05-05 PROCEDURE — 1160F RVW MEDS BY RX/DR IN RCRD: CPT | Mod: HCNC,CPTII,S$GLB, | Performed by: UROLOGY

## 2022-05-05 NOTE — PROGRESS NOTES
Chief Complaint: Elevated PSA    HPI:   08/12/2021 - patient returns today for follow-up, PSA down by about 1/2 from prior to starting finasteride, voiding well on dual medical therapy, notes some intermittent ED but not bothered, has had 2 prior biopsies, both were negative, no further issues with retention    2/8/21: Voiding a lot better after finasteride, PVR 26ml.  Reviewed history in detail. Discussed prospect of CaP.  8/26/20: Cysto today confirms severe BPH.  CT Urogram shows sig BPH. Prostate is measured at 97 gm, likely a little bigger allowing for technique.  7/29/20: Martinez out last week, stream is weak sometimes, others okay.  Macrobid complete.  Hematuria yesterday.  7/23/20: Stopped the flomax and never really started the finasteride and went into retention last week.  Flomax daily since martinez placed in ER.  Also taking the finasteride.  1/8/20: Asks about ED meds; discussed.  Emptying well now better than a year ago. Reviewed history in detail. Not sure he started finasteride.   1/2/19: No sig problems.  Feeling fine.  Had an episode of mild retention and went to the ER where he was given flomax and he voids better on it.  Takes it every 2d or so.  4/12/17: No problems at all.  4/11/16: No PSA done will order today.  Doing well.  Says that for the last four months when he drinks a bunch of beer he gets bilateral flank pain. No hematuria.  3/4/15: PSA essentially unchanged.  Oxybutinin and caffeine cessation have improved OAB but not even needing the oxybutinin lately.  10/10/14: Pt states that after a couple of beers his testicles start to get a dull pain and he also has some SP pressure before voiding.  Then he voids and it is relieved.  Fine during the day.  Really a problem when he drinks too much.  Good stream.  Doesn't hold too long.  Drinks coffee in AM, coke or two during the day, VO/coke,   9/3/14: Pt doing well from cyst excision.  PSA 8.4 on recheck after 14d cipro for PSA 10.  Left testicle  the same.  7/9/14: 69 yo man has a dull left testicular pain when he goes to bed but not during most of the day.  Keeps him from sleeping a little bit.  No hematuria.  No urolithiasis history.  Saw Dr. Haskins and another urologist for BPH and elevated PSA.  Had two negative prostate biopsies last 4 years ago and he was advised to have a third and he declined.  No urinary bother.  No constipation.  No hernias or related surgeries.    Allergies:  Eucrisa [crisaborole]    Medications:  has a current medication list which includes the following prescription(s): finasteride, tamsulosin, doxepin, doxycycline, halobetasol, levocetirizine, mometasone 0.1%, mupirocin, and tadalafil.    Review of Systems:  General: No fever, chills, fatigability, or weight loss.  Skin: No rashes, itching, or changes in color or texture of skin.  Chest: Denies AQUINO, cyanosis, wheezing, cough, and sputum production.  Abdomen: Appetite fine. No weight loss. Denies diarrhea, abdominal pain, hematemesis, or blood in stool.  Musculoskeletal: No joint stiffness or swelling. Some back pain.  : As above.  All other review of systems negative.    PMH:   has a past medical history of Eczema and Elevated PSA.    PSH:   has a past surgical history that includes Appendectomy and left foot surgery.    FamHx: family history includes Cancer in his father.    SocHx:  reports that he has quit smoking. He has never used smokeless tobacco. He reports current alcohol use. He reports that he does not use drugs.      Physical Exam:  Vitals:    05/05/22 1404   BP: 120/79   Pulse: 77   Temp: 97.8 °F (36.6 °C)     General: awake, alert, cooperative  Head: NC/AT  Ears: external ears normal  Eyes: sclera normal  Lungs: normal inspiration, NAD  Heart: well-perfused  Skin: The skin is warm and dry  Ext: No c/c/e.  Neuro: grossly intact, AOx3    Labs/Studies:   Lab Results   Component Value Date    CREATININE 0.8 07/29/2020    PSA 4.9 (H) 02/08/2021         PSA    7/29/14:  8.4    3/3/15: 9.2    4/16: 10.2    4/17: 9.3    12/19: 12.6    1/20: 11.7     8/21 - 6.0    Bladder Scan performed in office:     10/14: PVR 30 ml.    Impression/Plan:   BPH - continue Flomax and finasteride, briefly discussed surgical options, he notes that he would want to move forward with an operation if he has another episode of retention    Elevated PSA - 2 prior negative biopsies, would not rebiopsy unless he has a positive MRI, PSA today, call with results    CLAUDIA - Cialis p.r.n., refill provided    - f/u 6 months    Eric Panda MD

## 2022-05-10 ENCOUNTER — LAB VISIT (OUTPATIENT)
Dept: LAB | Facility: HOSPITAL | Age: 77
End: 2022-05-10
Attending: UROLOGY
Payer: COMMERCIAL

## 2022-05-10 DIAGNOSIS — R97.20 ELEVATED PSA: ICD-10-CM

## 2022-05-10 LAB — COMPLEXED PSA SERPL-MCNC: 5.4 NG/ML (ref 0–4)

## 2022-05-10 PROCEDURE — 84153 ASSAY OF PSA TOTAL: CPT | Mod: HCNC | Performed by: UROLOGY

## 2022-05-10 PROCEDURE — 36415 COLL VENOUS BLD VENIPUNCTURE: CPT | Mod: HCNC | Performed by: UROLOGY

## 2022-07-07 ENCOUNTER — OFFICE VISIT (OUTPATIENT)
Dept: UROLOGY | Facility: CLINIC | Age: 77
End: 2022-07-07
Payer: COMMERCIAL

## 2022-07-07 ENCOUNTER — TELEPHONE (OUTPATIENT)
Dept: UROLOGY | Facility: CLINIC | Age: 77
End: 2022-07-07
Payer: MEDICARE

## 2022-07-07 VITALS
HEART RATE: 75 BPM | WEIGHT: 164 LBS | DIASTOLIC BLOOD PRESSURE: 66 MMHG | BODY MASS INDEX: 25.69 KG/M2 | SYSTOLIC BLOOD PRESSURE: 111 MMHG

## 2022-07-07 DIAGNOSIS — R31.9 HEMATURIA, UNSPECIFIED TYPE: Primary | ICD-10-CM

## 2022-07-07 DIAGNOSIS — R10.9 ABDOMINAL PAIN, UNSPECIFIED ABDOMINAL LOCATION: ICD-10-CM

## 2022-07-07 PROCEDURE — 3078F DIAST BP <80 MM HG: CPT | Mod: HCNC,CPTII,S$GLB, | Performed by: UROLOGY

## 2022-07-07 PROCEDURE — 3074F PR MOST RECENT SYSTOLIC BLOOD PRESSURE < 130 MM HG: ICD-10-PCS | Mod: HCNC,CPTII,S$GLB, | Performed by: UROLOGY

## 2022-07-07 PROCEDURE — 3078F PR MOST RECENT DIASTOLIC BLOOD PRESSURE < 80 MM HG: ICD-10-PCS | Mod: HCNC,CPTII,S$GLB, | Performed by: UROLOGY

## 2022-07-07 PROCEDURE — 1159F PR MEDICATION LIST DOCUMENTED IN MEDICAL RECORD: ICD-10-PCS | Mod: HCNC,CPTII,S$GLB, | Performed by: UROLOGY

## 2022-07-07 PROCEDURE — 1159F MED LIST DOCD IN RCRD: CPT | Mod: HCNC,CPTII,S$GLB, | Performed by: UROLOGY

## 2022-07-07 PROCEDURE — 99214 PR OFFICE/OUTPT VISIT, EST, LEVL IV, 30-39 MIN: ICD-10-PCS | Mod: HCNC,S$GLB,, | Performed by: UROLOGY

## 2022-07-07 PROCEDURE — 99999 PR PBB SHADOW E&M-EST. PATIENT-LVL III: CPT | Mod: PBBFAC,HCNC,, | Performed by: UROLOGY

## 2022-07-07 PROCEDURE — 99999 PR PBB SHADOW E&M-EST. PATIENT-LVL III: ICD-10-PCS | Mod: PBBFAC,HCNC,, | Performed by: UROLOGY

## 2022-07-07 PROCEDURE — 1160F RVW MEDS BY RX/DR IN RCRD: CPT | Mod: HCNC,CPTII,S$GLB, | Performed by: UROLOGY

## 2022-07-07 PROCEDURE — 3074F SYST BP LT 130 MM HG: CPT | Mod: HCNC,CPTII,S$GLB, | Performed by: UROLOGY

## 2022-07-07 PROCEDURE — 1160F PR REVIEW ALL MEDS BY PRESCRIBER/CLIN PHARMACIST DOCUMENTED: ICD-10-PCS | Mod: HCNC,CPTII,S$GLB, | Performed by: UROLOGY

## 2022-07-07 PROCEDURE — 99214 OFFICE O/P EST MOD 30 MIN: CPT | Mod: HCNC,S$GLB,, | Performed by: UROLOGY

## 2022-07-07 NOTE — PROGRESS NOTES
Chief Complaint: Elevated PSA    HPI:   07/07/2022 - patient returns today for follow-up, notes new onset low back pain/flank pain on the left that radiates down to his left testicle, denies any voiding dysfunction, denies any fevers or gross hematuria, has never had a kidney stone previously    08/12/2021 - patient returns today for follow-up, PSA down by about 1/2 from prior to starting finasteride, voiding well on dual medical therapy, notes some intermittent ED but not bothered, has had 2 prior biopsies, both were negative, no further issues with retention    2/8/21: Voiding a lot better after finasteride, PVR 26ml.  Reviewed history in detail. Discussed prospect of CaP.  8/26/20: Cysto today confirms severe BPH.  CT Urogram shows sig BPH. Prostate is measured at 97 gm, likely a little bigger allowing for technique.  7/29/20: Maritnez out last week, stream is weak sometimes, others okay.  Macrobid complete.  Hematuria yesterday.  7/23/20: Stopped the flomax and never really started the finasteride and went into retention last week.  Flomax daily since martinez placed in ER.  Also taking the finasteride.  1/8/20: Asks about ED meds; discussed.  Emptying well now better than a year ago. Reviewed history in detail. Not sure he started finasteride.   1/2/19: No sig problems.  Feeling fine.  Had an episode of mild retention and went to the ER where he was given flomax and he voids better on it.  Takes it every 2d or so.  4/12/17: No problems at all.  4/11/16: No PSA done will order today.  Doing well.  Says that for the last four months when he drinks a bunch of beer he gets bilateral flank pain. No hematuria.  3/4/15: PSA essentially unchanged.  Oxybutinin and caffeine cessation have improved OAB but not even needing the oxybutinin lately.  10/10/14: Pt states that after a couple of beers his testicles start to get a dull pain and he also has some SP pressure before voiding.  Then he voids and it is relieved.  Fine during  the day.  Really a problem when he drinks too much.  Good stream.  Doesn't hold too long.  Drinks coffee in AM, coke or two during the day, VO/coke,   9/3/14: Pt doing well from cyst excision.  PSA 8.4 on recheck after 14d cipro for PSA 10.  Left testicle the same.  7/9/14: 67 yo man has a dull left testicular pain when he goes to bed but not during most of the day.  Keeps him from sleeping a little bit.  No hematuria.  No urolithiasis history.  Saw Dr. Haskins and another urologist for BPH and elevated PSA.  Had two negative prostate biopsies last 4 years ago and he was advised to have a third and he declined.  No urinary bother.  No constipation.  No hernias or related surgeries.    Allergies:  Eucrisa [crisaborole]    Medications:  has a current medication list which includes the following prescription(s): finasteride, tamsulosin, doxepin, doxycycline, halobetasol, levocetirizine, mometasone 0.1%, mupirocin, and tadalafil.    Review of Systems:  General: No fever, chills  Skin: No rashes  Chest:  Denies cough and sputum production  Heart: Denies chest pain  Resp: Denies dyspnea  Abdomen: Denies diarrhea, abdominal pain, hematemesis, or blood in stool.  Musculoskeletal: No joint stiffness or swelling. Denies back pain.  : see HPI  Neuro: no dizziness or weakness      PMH:   has a past medical history of Eczema and Elevated PSA.    PSH:   has a past surgical history that includes Appendectomy and left foot surgery.    FamHx: family history includes Cancer in his father.    SocHx:  reports that he has quit smoking. He has never used smokeless tobacco. He reports current alcohol use. He reports that he does not use drugs.      Physical Exam:  Vitals:    07/07/22 1458   BP: 111/66   Pulse: 75   General: awake, alert, cooperative  Head: NC/AT  Ears: external ears normal  Eyes: sclera normal  Lungs: normal inspiration, NAD  Heart: well-perfused  Abdomen: Soft, NT, ND  : Normal circ'd phallus, meatus normal in size and  position, BL testicles palpable, no masses, nontender, no abnormalities of epididymi  Lymphatic: groin nodes negative  Skin: The skin is warm and dry  Ext: No c/c/e.  Neuro: grossly intact, AOx3      Labs/Studies:   Lab Results   Component Value Date    CREATININE 0.8 07/29/2020    PSA 4.9 (H) 02/08/2021     PSA    7/29/14: 8.4    3/3/15: 9.2    4/16: 10.2    4/17: 9.3    12/19: 12.6    1/20: 11.7     8/21 - 6.0    Bladder Scan performed in office:     10/14: PVR 30 ml.    Impression/Plan:   Left flank pain - obtain CT stone, will message with result and proceed from there    BPH - continue Flomax and finasteride, briefly discussed surgical options, he notes that he would want to move forward with an operation if he has another episode of retention    Elevated PSA - 2 prior negative biopsies, would not rebiopsy unless he has a positive MRI, PSA today, call with results    CLAUDIA - Cialis p.r.n., refill provided    Eric Panda MD

## 2022-07-07 NOTE — TELEPHONE ENCOUNTER
Attempted to call patient to notify him of scheduled Urology appt 11-8-22.   Phone rings and then disconnects. Sent portal message to pt with next urology appt date.

## 2022-07-12 ENCOUNTER — HOSPITAL ENCOUNTER (OUTPATIENT)
Dept: RADIOLOGY | Facility: HOSPITAL | Age: 77
Discharge: HOME | End: 2022-07-12
Attending: UROLOGY
Payer: MEDICARE

## 2022-07-12 DIAGNOSIS — R10.9 ABDOMINAL PAIN, UNSPECIFIED ABDOMINAL LOCATION: ICD-10-CM

## 2022-07-12 PROCEDURE — 74176 CT ABD & PELVIS W/O CONTRAST: CPT | Mod: TC,HCNC

## 2022-08-16 ENCOUNTER — OFFICE VISIT (OUTPATIENT)
Dept: INTERNAL MEDICINE | Facility: CLINIC | Age: 77
End: 2022-08-16
Payer: COMMERCIAL

## 2022-08-16 ENCOUNTER — LAB VISIT (OUTPATIENT)
Dept: LAB | Facility: HOSPITAL | Age: 77
End: 2022-08-16
Attending: FAMILY MEDICINE
Payer: MEDICARE

## 2022-08-16 ENCOUNTER — PATIENT MESSAGE (OUTPATIENT)
Dept: INTERNAL MEDICINE | Facility: CLINIC | Age: 77
End: 2022-08-16

## 2022-08-16 VITALS
SYSTOLIC BLOOD PRESSURE: 110 MMHG | BODY MASS INDEX: 25.19 KG/M2 | TEMPERATURE: 98 F | HEART RATE: 69 BPM | DIASTOLIC BLOOD PRESSURE: 68 MMHG | WEIGHT: 160.5 LBS | HEIGHT: 67 IN | OXYGEN SATURATION: 97 %

## 2022-08-16 DIAGNOSIS — Z00.00 ANNUAL PHYSICAL EXAM: ICD-10-CM

## 2022-08-16 DIAGNOSIS — R10.32 GROIN PAIN, LEFT: ICD-10-CM

## 2022-08-16 DIAGNOSIS — E78.2 MIXED HYPERLIPIDEMIA: ICD-10-CM

## 2022-08-16 DIAGNOSIS — K40.20 BILATERAL INGUINAL HERNIA WITHOUT OBSTRUCTION OR GANGRENE, RECURRENCE NOT SPECIFIED: ICD-10-CM

## 2022-08-16 DIAGNOSIS — R10.32 GROIN PAIN, LEFT: Primary | ICD-10-CM

## 2022-08-16 LAB
ALBUMIN SERPL BCP-MCNC: 3.8 G/DL (ref 3.5–5.2)
ALP SERPL-CCNC: 71 U/L (ref 55–135)
ALT SERPL W/O P-5'-P-CCNC: 14 U/L (ref 10–44)
ANION GAP SERPL CALC-SCNC: 10 MMOL/L (ref 8–16)
AST SERPL-CCNC: 15 U/L (ref 10–40)
BASOPHILS # BLD AUTO: 0.03 K/UL (ref 0–0.2)
BASOPHILS NFR BLD: 0.4 % (ref 0–1.9)
BILIRUB SERPL-MCNC: 1.7 MG/DL (ref 0.1–1)
BILIRUB UR QL STRIP: NEGATIVE
BUN SERPL-MCNC: 14 MG/DL (ref 8–23)
CALCIUM SERPL-MCNC: 9.6 MG/DL (ref 8.7–10.5)
CHLORIDE SERPL-SCNC: 103 MMOL/L (ref 95–110)
CHOLEST SERPL-MCNC: 165 MG/DL (ref 120–199)
CHOLEST/HDLC SERPL: 5 {RATIO} (ref 2–5)
CLARITY UR: CLEAR
CO2 SERPL-SCNC: 25 MMOL/L (ref 23–29)
COLOR UR: YELLOW
CREAT SERPL-MCNC: 0.8 MG/DL (ref 0.5–1.4)
DIFFERENTIAL METHOD: ABNORMAL
EOSINOPHIL # BLD AUTO: 0.1 K/UL (ref 0–0.5)
EOSINOPHIL NFR BLD: 1.8 % (ref 0–8)
ERYTHROCYTE [DISTWIDTH] IN BLOOD BY AUTOMATED COUNT: 12.2 % (ref 11.5–14.5)
EST. GFR  (NO RACE VARIABLE): >60 ML/MIN/1.73 M^2
GLUCOSE SERPL-MCNC: 97 MG/DL (ref 70–110)
GLUCOSE UR QL STRIP: NEGATIVE
HCT VFR BLD AUTO: 47.8 % (ref 40–54)
HDLC SERPL-MCNC: 33 MG/DL (ref 40–75)
HDLC SERPL: 20 % (ref 20–50)
HGB BLD-MCNC: 16 G/DL (ref 14–18)
HGB UR QL STRIP: NEGATIVE
IMM GRANULOCYTES # BLD AUTO: 0.01 K/UL (ref 0–0.04)
IMM GRANULOCYTES NFR BLD AUTO: 0.1 % (ref 0–0.5)
KETONES UR QL STRIP: NEGATIVE
LDLC SERPL CALC-MCNC: 116 MG/DL (ref 63–159)
LEUKOCYTE ESTERASE UR QL STRIP: NEGATIVE
LYMPHOCYTES # BLD AUTO: 2 K/UL (ref 1–4.8)
LYMPHOCYTES NFR BLD: 30.3 % (ref 18–48)
MCH RBC QN AUTO: 31.4 PG (ref 27–31)
MCHC RBC AUTO-ENTMCNC: 33.5 G/DL (ref 32–36)
MCV RBC AUTO: 94 FL (ref 82–98)
MONOCYTES # BLD AUTO: 0.7 K/UL (ref 0.3–1)
MONOCYTES NFR BLD: 10.3 % (ref 4–15)
NEUTROPHILS # BLD AUTO: 3.8 K/UL (ref 1.8–7.7)
NEUTROPHILS NFR BLD: 57.1 % (ref 38–73)
NITRITE UR QL STRIP: NEGATIVE
NONHDLC SERPL-MCNC: 132 MG/DL
NRBC BLD-RTO: 0 /100 WBC
PH UR STRIP: 6 [PH] (ref 5–8)
PLATELET # BLD AUTO: 310 K/UL (ref 150–450)
PMV BLD AUTO: 9.6 FL (ref 9.2–12.9)
POTASSIUM SERPL-SCNC: 4.2 MMOL/L (ref 3.5–5.1)
PROT SERPL-MCNC: 7.1 G/DL (ref 6–8.4)
PROT UR QL STRIP: NEGATIVE
RBC # BLD AUTO: 5.1 M/UL (ref 4.6–6.2)
SODIUM SERPL-SCNC: 138 MMOL/L (ref 136–145)
SP GR UR STRIP: 1.01 (ref 1–1.03)
TRIGL SERPL-MCNC: 80 MG/DL (ref 30–150)
URN SPEC COLLECT METH UR: NORMAL
WBC # BLD AUTO: 6.67 K/UL (ref 3.9–12.7)

## 2022-08-16 PROCEDURE — 1101F PR PT FALLS ASSESS DOC 0-1 FALLS W/OUT INJ PAST YR: ICD-10-PCS | Mod: HCNC,CPTII,S$GLB, | Performed by: PHYSICIAN ASSISTANT

## 2022-08-16 PROCEDURE — 81003 URINALYSIS AUTO W/O SCOPE: CPT | Mod: HCNC | Performed by: PHYSICIAN ASSISTANT

## 2022-08-16 PROCEDURE — 1126F PR PAIN SEVERITY QUANTIFIED, NO PAIN PRESENT: ICD-10-PCS | Mod: HCNC,CPTII,S$GLB, | Performed by: PHYSICIAN ASSISTANT

## 2022-08-16 PROCEDURE — 3288F PR FALLS RISK ASSESSMENT DOCUMENTED: ICD-10-PCS | Mod: HCNC,CPTII,S$GLB, | Performed by: PHYSICIAN ASSISTANT

## 2022-08-16 PROCEDURE — 87086 URINE CULTURE/COLONY COUNT: CPT | Mod: HCNC | Performed by: PHYSICIAN ASSISTANT

## 2022-08-16 PROCEDURE — 99999 PR PBB SHADOW E&M-EST. PATIENT-LVL IV: ICD-10-PCS | Mod: PBBFAC,HCNC,, | Performed by: PHYSICIAN ASSISTANT

## 2022-08-16 PROCEDURE — 80061 LIPID PANEL: CPT | Mod: HCNC | Performed by: PHYSICIAN ASSISTANT

## 2022-08-16 PROCEDURE — 3078F DIAST BP <80 MM HG: CPT | Mod: HCNC,CPTII,S$GLB, | Performed by: PHYSICIAN ASSISTANT

## 2022-08-16 PROCEDURE — 1160F RVW MEDS BY RX/DR IN RCRD: CPT | Mod: HCNC,CPTII,S$GLB, | Performed by: PHYSICIAN ASSISTANT

## 2022-08-16 PROCEDURE — 3074F PR MOST RECENT SYSTOLIC BLOOD PRESSURE < 130 MM HG: ICD-10-PCS | Mod: HCNC,CPTII,S$GLB, | Performed by: PHYSICIAN ASSISTANT

## 2022-08-16 PROCEDURE — 3288F FALL RISK ASSESSMENT DOCD: CPT | Mod: HCNC,CPTII,S$GLB, | Performed by: PHYSICIAN ASSISTANT

## 2022-08-16 PROCEDURE — 99999 PR PBB SHADOW E&M-EST. PATIENT-LVL IV: CPT | Mod: PBBFAC,HCNC,, | Performed by: PHYSICIAN ASSISTANT

## 2022-08-16 PROCEDURE — 1159F MED LIST DOCD IN RCRD: CPT | Mod: HCNC,CPTII,S$GLB, | Performed by: PHYSICIAN ASSISTANT

## 2022-08-16 PROCEDURE — 80053 COMPREHEN METABOLIC PANEL: CPT | Mod: HCNC | Performed by: PHYSICIAN ASSISTANT

## 2022-08-16 PROCEDURE — 1159F PR MEDICATION LIST DOCUMENTED IN MEDICAL RECORD: ICD-10-PCS | Mod: HCNC,CPTII,S$GLB, | Performed by: PHYSICIAN ASSISTANT

## 2022-08-16 PROCEDURE — 86803 HEPATITIS C AB TEST: CPT | Mod: HCNC | Performed by: PHYSICIAN ASSISTANT

## 2022-08-16 PROCEDURE — 85025 COMPLETE CBC W/AUTO DIFF WBC: CPT | Mod: HCNC | Performed by: PHYSICIAN ASSISTANT

## 2022-08-16 PROCEDURE — 1126F AMNT PAIN NOTED NONE PRSNT: CPT | Mod: HCNC,CPTII,S$GLB, | Performed by: PHYSICIAN ASSISTANT

## 2022-08-16 PROCEDURE — 3074F SYST BP LT 130 MM HG: CPT | Mod: HCNC,CPTII,S$GLB, | Performed by: PHYSICIAN ASSISTANT

## 2022-08-16 PROCEDURE — 99214 PR OFFICE/OUTPT VISIT, EST, LEVL IV, 30-39 MIN: ICD-10-PCS | Mod: HCNC,S$GLB,, | Performed by: PHYSICIAN ASSISTANT

## 2022-08-16 PROCEDURE — 1160F PR REVIEW ALL MEDS BY PRESCRIBER/CLIN PHARMACIST DOCUMENTED: ICD-10-PCS | Mod: HCNC,CPTII,S$GLB, | Performed by: PHYSICIAN ASSISTANT

## 2022-08-16 PROCEDURE — 1101F PT FALLS ASSESS-DOCD LE1/YR: CPT | Mod: HCNC,CPTII,S$GLB, | Performed by: PHYSICIAN ASSISTANT

## 2022-08-16 PROCEDURE — 99214 OFFICE O/P EST MOD 30 MIN: CPT | Mod: HCNC,S$GLB,, | Performed by: PHYSICIAN ASSISTANT

## 2022-08-16 PROCEDURE — 3078F PR MOST RECENT DIASTOLIC BLOOD PRESSURE < 80 MM HG: ICD-10-PCS | Mod: HCNC,CPTII,S$GLB, | Performed by: PHYSICIAN ASSISTANT

## 2022-08-16 RX ORDER — PRAVASTATIN SODIUM 40 MG/1
40 TABLET ORAL DAILY
Qty: 90 TABLET | Refills: 3 | Status: SHIPPED | OUTPATIENT
Start: 2022-08-16 | End: 2023-08-16

## 2022-08-16 NOTE — PROGRESS NOTES
Subjective:      Patient ID: Seamus Benitez is a 77 y.o. male.    Chief Complaint: Flank Pain    Groin Pain  The patient's primary symptoms include pelvic pain and testicular pain. The patient's pertinent negatives include no genital injury, genital itching, genital lesions, penile discharge, penile pain, priapism or scrotal swelling. This is a new problem. Episode onset: 6 months. The problem occurs intermittently. The problem has been waxing and waning. The pain is moderate. Associated symptoms include flank pain. Pertinent negatives include no abdominal pain, anorexia, chest pain, chills, constipation, coughing, diarrhea, discolored urine, dysuria, fever, frequency, headaches, hematuria, hesitancy, joint pain, joint swelling, nausea, painful intercourse, rash, shortness of breath, sore throat, urgency, urinary retention or vomiting. The testicular pain affects the left testicle. The color of the testicles is normal. The symptoms are aggravated by heavy lifting (alcohol). He has tried nothing for the symptoms. There is no history of BPH, chlamydia, cryptorchidism, erectile aid use, erectile dysfunction, a femoral hernia, gonorrhea, herpes simplex, HIV, an inguinal hernia, kidney stones, prostatitis, sickle cell disease, syphilis or varicocele.     Pain in left groin. From testicle radiating to his back for the past 6 months. Pain comes and goes. Has not tried any treatment for this.  No blood in urine. Worse with exertion.   Has seen urology for this. CT scan was done which revealed multiple hernias.     Overdue for labs and follow up with pcp. Will schedule.     Patient Active Problem List   Diagnosis    Skin lesion    Elevated PSA    Erectile dysfunction    Benign prostatic hyperplasia with weak urinary stream       Current Outpatient Medications:     tamsulosin (FLOMAX) 0.4 mg Cap, Take 1 capsule (0.4 mg total) by mouth once daily., Disp: 90 capsule, Rfl: 3    doxepin (SINEQUAN) 10 MG capsule, TAKE 1  CAPSULE (10 MG TOTAL) BY MOUTH EVERY EVENING. (Patient not taking: Reported on 5/5/2022), Disp: 90 capsule, Rfl: 1    doxycycline (MONODOX) 100 MG capsule, TAKE 1 CAPSULE BY MOUTH EVERY DAY WITH FOOD-MAY CAUSE STOMACH UPSET (Patient not taking: Reported on 5/5/2022), Disp: 30 capsule, Rfl: 0    finasteride (PROSCAR) 5 mg tablet, Take 1 tablet (5 mg total) by mouth once daily., Disp: 90 tablet, Rfl: 3    halobetasol (ULTRAVATE) 0.05 % ointment, Apply topically 2 (two) times daily. Steroid medication.  Start after finishing antibiotics. (Patient not taking: Reported on 5/5/2022), Disp: 50 g, Rfl: 1    levocetirizine (XYZAL) 5 MG tablet, Take 1 tablet each evening (Patient not taking: Reported on 5/5/2022), Disp: 30 tablet, Rfl: 11    mometasone 0.1% (ELOCON) 0.1 % cream, APPLY TO AFFECTED AREA TWICE A DAY TO RASH ON RIGHT LEG (Patient not taking: Reported on 5/5/2022), Disp: 45 g, Rfl: 3    mupirocin (BACTROBAN) 2 % ointment, AAA bid for 10 days for skin infection (Patient not taking: Reported on 5/5/2022), Disp: 60 g, Rfl: 1    tadalafiL (CIALIS) 20 MG Tab, Take 1 tablet (20 mg total) by mouth once daily. (Patient not taking: Reported on 5/5/2022), Disp: 10 tablet, Rfl: 11    Health Maintenance Due   Topic Date Due    Hepatitis C Screening  Never done    Lipid Panel  Never done    COVID-19 Vaccine (1) Never done    TETANUS VACCINE  Never done    Shingles Vaccine (1 of 2) Never done    Pneumococcal Vaccines (Age 65+) (2 - PCV) 04/30/2016       Review of Systems   Constitutional: Negative for activity change, appetite change, chills, diaphoresis, fatigue, fever and unexpected weight change.   HENT: Negative.  Negative for congestion, hearing loss, postnasal drip, rhinorrhea, sore throat, trouble swallowing and voice change.    Eyes: Negative.  Negative for visual disturbance.   Respiratory: Negative.  Negative for cough, choking, chest tightness and shortness of breath.    Cardiovascular: Negative for  "chest pain, palpitations and leg swelling.   Gastrointestinal: Negative for abdominal distention, abdominal pain, anorexia, blood in stool, constipation, diarrhea, nausea and vomiting.   Endocrine: Negative for cold intolerance, heat intolerance, polydipsia and polyuria.   Genitourinary: Positive for flank pain, pelvic pain and testicular pain. Negative for decreased urine volume, difficulty urinating, dysuria, enuresis, frequency, genital sores, hematuria, hesitancy, penile discharge, penile pain, penile swelling, scrotal swelling and urgency.   Musculoskeletal: Negative for arthralgias, back pain, gait problem, joint pain, joint swelling and myalgias.   Skin: Negative for color change, pallor, rash and wound.   Neurological: Negative for dizziness, tremors, weakness, light-headedness, numbness and headaches.   Hematological: Negative for adenopathy.   Psychiatric/Behavioral: Negative for behavioral problems, confusion, self-injury, sleep disturbance and suicidal ideas. The patient is not nervous/anxious.      Objective:   /68 (BP Location: Right arm, Patient Position: Sitting, BP Method: Medium (Manual))   Pulse 69   Temp 97.8 °F (36.6 °C) (Tympanic)   Ht 5' 7" (1.702 m)   Wt 72.8 kg (160 lb 7.9 oz)   SpO2 97%   BMI 25.14 kg/m²     Physical Exam  Vitals reviewed.   Constitutional:       General: He is not in acute distress.     Appearance: Normal appearance. He is well-developed. He is not ill-appearing, toxic-appearing or diaphoretic.   HENT:      Head: Normocephalic and atraumatic.      Right Ear: External ear normal.      Left Ear: External ear normal.      Nose: Nose normal.   Eyes:      Conjunctiva/sclera: Conjunctivae normal.      Pupils: Pupils are equal, round, and reactive to light.   Cardiovascular:      Rate and Rhythm: Normal rate and regular rhythm.      Heart sounds: Normal heart sounds. No murmur heard.    No friction rub. No gallop.   Pulmonary:      Effort: Pulmonary effort is normal. " No respiratory distress.      Breath sounds: Normal breath sounds. No wheezing or rales.   Chest:      Chest wall: No tenderness.   Abdominal:      General: There is no distension.      Palpations: Abdomen is soft.      Tenderness: There is no abdominal tenderness.   Musculoskeletal:         General: Normal range of motion.      Cervical back: Normal range of motion and neck supple.   Lymphadenopathy:      Cervical: No cervical adenopathy.   Skin:     General: Skin is warm and dry.   Neurological:      Mental Status: He is alert and oriented to person, place, and time.   Psychiatric:         Behavior: Behavior normal.         Thought Content: Thought content normal.         Judgment: Judgment normal.       CT Renal Stone Study ABD Pelvis WO  Narrative: EXAMINATION:  CT RENAL STONE STUDY ABD PELVIS WO, multiplanar reconstructions    CLINICAL HISTORY:  Unspecified abdominal painFlank pain, kidney stone suspected;    TECHNIQUE:  Axial images through the abdomen and pelvis were obtained without the use of IV contrast.  Sagittal and coronal reconstructions are provided for review.  Oral contrast was not utilized.    COMPARISON:  August 7, 2020    FINDINGS:  LUNG BASES: Stable bilateral lower lobe calcified granulomas.  Lung bases are free of new pulmonary opacities.  Negative for pleural or pericardial effusions. The distal esophagus is normal.  Stable coronary artery calcifications.    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  Fatty infiltration of the pancreas again seen.  The liver, spleen and gallbladder otherwise appear normal.  The pancreas is unremarkable.  Kidneys and adrenal glands are otherwise normal.    Negative for adenopathy, free fluid or inflammatory change noted within the abdomen or pelvis.  Vascular calcifications are present without aneurysmal changes.    The bowel appears normal. Postoperative changes in the region of the appendix again seen.  There are a few scattered colonic  diverticula.  Negative for free air.    PELVIS: The urinary bladder is unremarkable.  Prostate gland is markedly enlarged with calcifications.  The rest of the male pelvic organs are normal. There are pelvic phleboliths.    Fat filled left greater than right inguinal hernias again seen.  Tiny fat filled umbilical hernia again seen.  The abdominal wall is otherwise intact.    No significant osseous abnormality is identified.  Negative for significant spinal canal stenosis. Similar degree of moderate degenerative facet arthropathy and mild degenerative disc changes.    Negative for groin adenopathy.  Impression: 1.  Negative for acute process involving the abdomen or pelvis.  Negative for inflammatory changes.  Negative for renal stone disease or hydronephrosis.  Negative for free air.    2. Numerous stable findings as noted above, to include marked prostate gland enlargement, surgically absent appendix, vascular calcifications without aneurysmal change, coronary artery calcifications, evidence for old granulomatous disease, mild degenerative changes of the spine and pelvis, as well as left greater than right fat filled inguinal hernias and a small fat filled umbilical hernia.    All CT scans at this facility are performed  using dose modulation techniques as appropriate to performed exam including the following:  automated exposure control; adjustment of mA and/or kV according to the patients size (this includes techniques or standardized protocols for targeted exams where dose is matched to indication/reason for exam: i.e. extremities or head);  iterative reconstruction technique.    Electronically signed by: Chico Heath MD  Date:    07/12/2022  Time:    09:12    Assessment:     1. Groin pain, left    2. Bilateral inguinal hernia without obstruction or gangrene, recurrence not specified    3. Annual physical exam      Plan:   Groin pain, left  -     Ambulatory referral/consult to General Surgery; Future; Expected  date: 08/23/2022  -     Urinalysis; Future; Expected date: 08/16/2022  -     Urine culture; Future    Bilateral inguinal hernia without obstruction or gangrene, recurrence not specified  -     Ambulatory referral/consult to General Surgery; Future; Expected date: 08/23/2022    Annual physical exam  -     CBC Auto Differential; Future; Expected date: 08/16/2022  -     Comprehensive Metabolic Panel; Future  -     Lipid Panel; Future; Expected date: 08/16/2022  -     Hepatitis C Antibody; Future; Expected date: 08/16/2022      Follow up in about 3 months (around 11/16/2022), or if symptoms worsen or fail to improve.

## 2022-08-18 LAB
BACTERIA UR CULT: NO GROWTH
HCV AB SERPL QL IA: NEGATIVE

## 2022-08-19 ENCOUNTER — OFFICE VISIT (OUTPATIENT)
Dept: SURGERY | Facility: CLINIC | Age: 77
End: 2022-08-19
Payer: COMMERCIAL

## 2022-08-19 ENCOUNTER — TELEPHONE (OUTPATIENT)
Dept: SURGERY | Facility: CLINIC | Age: 77
End: 2022-08-19

## 2022-08-19 ENCOUNTER — HOSPITAL ENCOUNTER (OUTPATIENT)
Dept: CARDIOLOGY | Facility: HOSPITAL | Age: 77
Discharge: HOME | End: 2022-08-19
Payer: COMMERCIAL

## 2022-08-19 VITALS
DIASTOLIC BLOOD PRESSURE: 67 MMHG | BODY MASS INDEX: 25.19 KG/M2 | WEIGHT: 160.5 LBS | SYSTOLIC BLOOD PRESSURE: 112 MMHG | HEART RATE: 70 BPM | HEIGHT: 67 IN

## 2022-08-19 DIAGNOSIS — Z01.818 PRE-OP TESTING: Primary | ICD-10-CM

## 2022-08-19 DIAGNOSIS — R10.32 GROIN PAIN, LEFT: ICD-10-CM

## 2022-08-19 DIAGNOSIS — Z01.818 PRE-OP TESTING: ICD-10-CM

## 2022-08-19 DIAGNOSIS — K40.20 BILATERAL INGUINAL HERNIA WITHOUT OBSTRUCTION OR GANGRENE, RECURRENCE NOT SPECIFIED: ICD-10-CM

## 2022-08-19 DIAGNOSIS — K40.20 BILATERAL INGUINAL HERNIA WITHOUT OBSTRUCTION OR GANGRENE, RECURRENCE NOT SPECIFIED: Primary | ICD-10-CM

## 2022-08-19 PROCEDURE — 99999 PR PBB SHADOW E&M-EST. PATIENT-LVL V: CPT | Mod: PBBFAC,HCNC,,

## 2022-08-19 PROCEDURE — 1160F RVW MEDS BY RX/DR IN RCRD: CPT | Mod: HCNC,CPTII,S$GLB,

## 2022-08-19 PROCEDURE — 99204 OFFICE O/P NEW MOD 45 MIN: CPT | Mod: HCNC,S$GLB,,

## 2022-08-19 PROCEDURE — 3074F PR MOST RECENT SYSTOLIC BLOOD PRESSURE < 130 MM HG: ICD-10-PCS | Mod: HCNC,CPTII,S$GLB,

## 2022-08-19 PROCEDURE — 93010 ELECTROCARDIOGRAM REPORT: CPT | Mod: HCNC,,, | Performed by: INTERNAL MEDICINE

## 2022-08-19 PROCEDURE — 3074F SYST BP LT 130 MM HG: CPT | Mod: HCNC,CPTII,S$GLB,

## 2022-08-19 PROCEDURE — 1159F PR MEDICATION LIST DOCUMENTED IN MEDICAL RECORD: ICD-10-PCS | Mod: HCNC,CPTII,S$GLB,

## 2022-08-19 PROCEDURE — 1125F PR PAIN SEVERITY QUANTIFIED, PAIN PRESENT: ICD-10-PCS | Mod: HCNC,CPTII,S$GLB,

## 2022-08-19 PROCEDURE — 3078F DIAST BP <80 MM HG: CPT | Mod: HCNC,CPTII,S$GLB,

## 2022-08-19 PROCEDURE — 93010 EKG 12-LEAD: ICD-10-PCS | Mod: HCNC,,, | Performed by: INTERNAL MEDICINE

## 2022-08-19 PROCEDURE — 99204 PR OFFICE/OUTPT VISIT, NEW, LEVL IV, 45-59 MIN: ICD-10-PCS | Mod: HCNC,S$GLB,,

## 2022-08-19 PROCEDURE — 1160F PR REVIEW ALL MEDS BY PRESCRIBER/CLIN PHARMACIST DOCUMENTED: ICD-10-PCS | Mod: HCNC,CPTII,S$GLB,

## 2022-08-19 PROCEDURE — 1159F MED LIST DOCD IN RCRD: CPT | Mod: HCNC,CPTII,S$GLB,

## 2022-08-19 PROCEDURE — 1125F AMNT PAIN NOTED PAIN PRSNT: CPT | Mod: HCNC,CPTII,S$GLB,

## 2022-08-19 PROCEDURE — 3078F PR MOST RECENT DIASTOLIC BLOOD PRESSURE < 80 MM HG: ICD-10-PCS | Mod: HCNC,CPTII,S$GLB,

## 2022-08-19 PROCEDURE — 93005 ELECTROCARDIOGRAM TRACING: CPT | Mod: HCNC

## 2022-08-19 PROCEDURE — 99999 PR PBB SHADOW E&M-EST. PATIENT-LVL V: ICD-10-PCS | Mod: PBBFAC,HCNC,,

## 2022-08-19 RX ORDER — ONDANSETRON 2 MG/ML
4 INJECTION INTRAMUSCULAR; INTRAVENOUS EVERY 12 HOURS PRN
OUTPATIENT
Start: 2022-08-19

## 2022-08-19 RX ORDER — SODIUM CHLORIDE, SODIUM LACTATE, POTASSIUM CHLORIDE, CALCIUM CHLORIDE 600; 310; 30; 20 MG/100ML; MG/100ML; MG/100ML; MG/100ML
INJECTION, SOLUTION INTRAVENOUS CONTINUOUS
OUTPATIENT
Start: 2022-08-19

## 2022-08-19 NOTE — PROGRESS NOTES
[facility]  History & Physical    SUBJECTIVE:     History of Present Illness:  Patient is a 77 y.o. male presents for evaluation of left testicular and lower back pain. He was undergoing urologic workup for this and found to have bilateral inguinal hernias as well as a small umbilical hernia. He relates the back pain to movement but also if he takes in a lot of fluids. He denies any associated fevers, chills, nausea, and vomiting. His past surgical history is significant for lap appendectomy. He denies any pulmonary or cardiac history.    Chief Complaint   Patient presents with    Consult    Hernia     Inguinal hernia       Review of patient's allergies indicates:   Allergen Reactions    Eucrisa [crisaborole] Hives       Current Outpatient Medications   Medication Sig Dispense Refill    doxepin (SINEQUAN) 10 MG capsule TAKE 1 CAPSULE (10 MG TOTAL) BY MOUTH EVERY EVENING. 90 capsule 1    doxycycline (MONODOX) 100 MG capsule TAKE 1 CAPSULE BY MOUTH EVERY DAY WITH FOOD-MAY CAUSE STOMACH UPSET 30 capsule 0    halobetasol (ULTRAVATE) 0.05 % ointment Apply topically 2 (two) times daily. Steroid medication.  Start after finishing antibiotics. (Patient taking differently: Apply topically 2 (two) times daily. Steroid medication.  Start after finishing antibiotics.) 50 g 1    levocetirizine (XYZAL) 5 MG tablet Take 1 tablet each evening 30 tablet 11    mometasone 0.1% (ELOCON) 0.1 % cream APPLY TO AFFECTED AREA TWICE A DAY TO RASH ON RIGHT LEG 45 g 3    mupirocin (BACTROBAN) 2 % ointment AAA bid for 10 days for skin infection 60 g 1    pravastatin (PRAVACHOL) 40 MG tablet Take 1 tablet (40 mg total) by mouth once daily. 90 tablet 3    tamsulosin (FLOMAX) 0.4 mg Cap Take 1 capsule (0.4 mg total) by mouth once daily. 90 capsule 3    finasteride (PROSCAR) 5 mg tablet Take 1 tablet (5 mg total) by mouth once daily. 90 tablet 3    tadalafiL (CIALIS) 20 MG Tab Take 1 tablet (20 mg total) by mouth once  "daily. (Patient not taking: Reported on 5/5/2022) 10 tablet 11     No current facility-administered medications for this visit.       Past Medical History:   Diagnosis Date    Eczema     Dermatologist Dr. Javier Jiménez    Elevated PSA      Past Surgical History:   Procedure Laterality Date    APPENDECTOMY      left foot surgery       Family History   Problem Relation Age of Onset    Cancer Father      Social History     Tobacco Use    Smoking status: Former Smoker    Smokeless tobacco: Never Used   Substance Use Topics    Alcohol use: Yes    Drug use: No        Review of Systems:  Review of Systems   Constitutional: Negative for chills, fever and unexpected weight change.   HENT: Negative for congestion.    Eyes: Negative for visual disturbance.   Respiratory: Negative for shortness of breath.    Cardiovascular: Negative for chest pain.   Gastrointestinal: Negative for abdominal distention, abdominal pain, anal bleeding, blood in stool, constipation, diarrhea, nausea, rectal pain and vomiting.   Genitourinary: Positive for testicular pain (left). Negative for dysuria, scrotal swelling and urgency.   Musculoskeletal: Positive for back pain (left lower). Negative for arthralgias.   Skin: Negative for rash and wound.   Neurological: Negative for light-headedness.   Hematological: Negative for adenopathy.       OBJECTIVE:     Vital Signs (Most Recent)  Pulse: 70 (08/19/22 1020)  BP: 112/67 (08/19/22 1020)  5' 7" (1.702 m)  72.8 kg (160 lb 7.9 oz)     Physical Exam:  Physical Exam  Vitals reviewed.   Constitutional:       General: He is not in acute distress.     Appearance: He is well-developed. He is not ill-appearing.   HENT:      Head: Normocephalic and atraumatic.      Right Ear: External ear normal.      Left Ear: External ear normal.   Eyes:      Extraocular Movements: Extraocular movements intact.      Conjunctiva/sclera: Conjunctivae normal.   Cardiovascular:      Rate and Rhythm: Normal rate. "   Pulmonary:      Effort: Pulmonary effort is normal. No respiratory distress.   Abdominal:      General: There is no distension.      Palpations: Abdomen is soft.      Tenderness: There is no abdominal tenderness.      Hernia: A hernia is present.      Comments: Non-tender and reducible left inguinal, right inguinal, and small umbilical hernias.   Musculoskeletal:      Cervical back: Normal range of motion and neck supple.   Skin:     General: Skin is warm and dry.   Neurological:      Mental Status: He is alert and oriented to person, place, and time.   Psychiatric:         Behavior: Behavior normal.         Diagnostic Results:  Recent CT scan reviewed.    ASSESSMENT/PLAN:     76 y/o male with bilateral inguinal hernias as well as a small umbilical hernia.    - Explained pathophysiology of hernias and reasoning for undergoing elective hernia repair. The patient would like to proceed.  - To OR for robotic repair with Dr. Griffin, who obtained informed consent.   - Pre-op: CBC, CMP, EKG  - Educated on signs and symptoms of hernia incarceration/strangulation and gave ED precautions.  - RTC post-operatively    Fatuma De La Rosa PA-C  [facility]      I spent a total of 45 minutes on the day of the visit.This includes face to face time and non-face to face time preparing to see the patient (eg, review of tests), obtaining and/or reviewing separately obtained history, documenting clinical information in the electronic or other health record, independently interpreting results and communicating results to the patient/family/caregiver, or care coordinator.

## 2022-08-19 NOTE — TELEPHONE ENCOUNTER
"----- Message from Geeta Molina RN sent at 8/19/2022 11:40 AM CDT -----  Regarding: surgery 8/29  Good morning, I noticed under the tab "IMMUNIZATIONS" that this pt has 2 covid vaccines. Do you know why he has covid appt made for 8/26? He will not require covid test for surgery. Thanks.    -PRE ADMIT TEST DEPT.    "

## 2022-08-19 NOTE — TELEPHONE ENCOUNTER
Called pt to notify that since he has two covid vaccinations, the clinic will not require the covid test scheduled 3 days before the procedure with Dr. Griffin per pre-admit, will cancel appointment, pt verbalized understanding.

## 2022-08-24 ENCOUNTER — TELEPHONE (OUTPATIENT)
Dept: SURGERY | Facility: CLINIC | Age: 77
End: 2022-08-24
Payer: MEDICARE

## 2022-08-24 NOTE — TELEPHONE ENCOUNTER
Called and spoke with patient about rescheduling surgery. Patient stated he would like to push the surgery back for 2 weeks. Informed patient I will send Dr. Griffin a message to get the next available surgery date and I will call him back. Patient verbalized understanding.    ----- Message from Shae Savage sent at 8/24/2022 10:04 AM CDT -----  Contact: Seamus  Type:  Sooner Apoointment Request    Caller is requesting a sooner appointment. Caller will not accept being placed on the waitlist and is requesting a message be sent to doctor.  Name of Caller:Seamus  When is the first available appointment?8/29/22  Symptoms:procedure  Would the patient rather a call back or a response via [facility]? call  Best Call Back Number:[phone]  Additional Information: Patient request to reschedule procedure for a later date. Please give patient a call back when possible.   Thank you,  GH

## 2022-08-25 ENCOUNTER — TELEPHONE (OUTPATIENT)
Dept: SURGERY | Facility: CLINIC | Age: 77
End: 2022-08-25
Payer: MEDICARE

## 2022-08-25 NOTE — TELEPHONE ENCOUNTER
Called and spoke with patient regarding surgery. Patient stated he would like to push surgery back 2 weeks. Informed patient a message was sent to Dr. Griffin so she can provide me with the available surgery date. Advised patient I will call back once I receive the new dates. Patient verbalized understanding.       ----- Message from Mariella Hung MA sent at 8/25/2022 11:41 AM CDT -----  Regarding: FW: surgery 8/29    ----- Message -----  From: Geeta Molina RN  Sent: 8/25/2022  10:24 AM CDT  To: [facility]  Subject: surgery 8/29                                     Good morning, this pt called my office and left a voicemail stating that he wishes to cancel surgery on 8/29/22. I have tried returning his call several times but unable to reach. Please call him to inquire about surgery cancellation. Thanks.    -[facility] Dept.  [phone]

## 2022-08-29 ENCOUNTER — TELEPHONE (OUTPATIENT)
Dept: SURGERY | Facility: CLINIC | Age: 77
End: 2022-08-29
Payer: MEDICARE

## 2022-08-29 ENCOUNTER — PATIENT MESSAGE (OUTPATIENT)
Dept: SURGERY | Facility: CLINIC | Age: 77
End: 2022-08-29
Payer: MEDICARE

## 2022-08-31 ENCOUNTER — OFFICE VISIT (OUTPATIENT)
Dept: DERMATOLOGY | Facility: CLINIC | Age: 77
End: 2022-08-31
Payer: COMMERCIAL

## 2022-08-31 DIAGNOSIS — L57.0 ACTINIC KERATOSIS: Primary | ICD-10-CM

## 2022-08-31 DIAGNOSIS — T14.8XXA WOUND OF SKIN: ICD-10-CM

## 2022-08-31 DIAGNOSIS — B07.9 VERRUCA VULGARIS: ICD-10-CM

## 2022-08-31 PROCEDURE — 17000 PR DESTRUCTION(LASER SURGERY,CRYOSURGERY,CHEMOSURGERY),PREMALIGNANT LESIONS,FIRST LESION: ICD-10-PCS | Mod: HCNC,59,S$GLB, | Performed by: STUDENT IN AN ORGANIZED HEALTH CARE EDUCATION/TRAINING PROGRAM

## 2022-08-31 PROCEDURE — 99999 PR PBB SHADOW E&M-EST. PATIENT-LVL III: ICD-10-PCS | Mod: PBBFAC,HCNC,, | Performed by: STUDENT IN AN ORGANIZED HEALTH CARE EDUCATION/TRAINING PROGRAM

## 2022-08-31 PROCEDURE — 17110 DESTRUCTION B9 LES UP TO 14: CPT | Mod: HCNC,S$GLB,, | Performed by: STUDENT IN AN ORGANIZED HEALTH CARE EDUCATION/TRAINING PROGRAM

## 2022-08-31 PROCEDURE — 3288F FALL RISK ASSESSMENT DOCD: CPT | Mod: HCNC,CPTII,S$GLB, | Performed by: STUDENT IN AN ORGANIZED HEALTH CARE EDUCATION/TRAINING PROGRAM

## 2022-08-31 PROCEDURE — 3288F PR FALLS RISK ASSESSMENT DOCUMENTED: ICD-10-PCS | Mod: HCNC,CPTII,S$GLB, | Performed by: STUDENT IN AN ORGANIZED HEALTH CARE EDUCATION/TRAINING PROGRAM

## 2022-08-31 PROCEDURE — 17110 PR DESTRUCTION BENIGN LESIONS UP TO 14: ICD-10-PCS | Mod: HCNC,S$GLB,, | Performed by: STUDENT IN AN ORGANIZED HEALTH CARE EDUCATION/TRAINING PROGRAM

## 2022-08-31 PROCEDURE — 99999 PR PBB SHADOW E&M-EST. PATIENT-LVL III: CPT | Mod: PBBFAC,HCNC,, | Performed by: STUDENT IN AN ORGANIZED HEALTH CARE EDUCATION/TRAINING PROGRAM

## 2022-08-31 PROCEDURE — 99203 OFFICE O/P NEW LOW 30 MIN: CPT | Mod: 25,HCNC,S$GLB, | Performed by: STUDENT IN AN ORGANIZED HEALTH CARE EDUCATION/TRAINING PROGRAM

## 2022-08-31 PROCEDURE — 1101F PT FALLS ASSESS-DOCD LE1/YR: CPT | Mod: HCNC,CPTII,S$GLB, | Performed by: STUDENT IN AN ORGANIZED HEALTH CARE EDUCATION/TRAINING PROGRAM

## 2022-08-31 PROCEDURE — 1101F PR PT FALLS ASSESS DOC 0-1 FALLS W/OUT INJ PAST YR: ICD-10-PCS | Mod: HCNC,CPTII,S$GLB, | Performed by: STUDENT IN AN ORGANIZED HEALTH CARE EDUCATION/TRAINING PROGRAM

## 2022-08-31 PROCEDURE — 1160F RVW MEDS BY RX/DR IN RCRD: CPT | Mod: HCNC,CPTII,S$GLB, | Performed by: STUDENT IN AN ORGANIZED HEALTH CARE EDUCATION/TRAINING PROGRAM

## 2022-08-31 PROCEDURE — 1126F PR PAIN SEVERITY QUANTIFIED, NO PAIN PRESENT: ICD-10-PCS | Mod: HCNC,CPTII,S$GLB, | Performed by: STUDENT IN AN ORGANIZED HEALTH CARE EDUCATION/TRAINING PROGRAM

## 2022-08-31 PROCEDURE — 1159F PR MEDICATION LIST DOCUMENTED IN MEDICAL RECORD: ICD-10-PCS | Mod: HCNC,CPTII,S$GLB, | Performed by: STUDENT IN AN ORGANIZED HEALTH CARE EDUCATION/TRAINING PROGRAM

## 2022-08-31 PROCEDURE — 1126F AMNT PAIN NOTED NONE PRSNT: CPT | Mod: HCNC,CPTII,S$GLB, | Performed by: STUDENT IN AN ORGANIZED HEALTH CARE EDUCATION/TRAINING PROGRAM

## 2022-08-31 PROCEDURE — 99203 PR OFFICE/OUTPT VISIT, NEW, LEVL III, 30-44 MIN: ICD-10-PCS | Mod: 25,HCNC,S$GLB, | Performed by: STUDENT IN AN ORGANIZED HEALTH CARE EDUCATION/TRAINING PROGRAM

## 2022-08-31 PROCEDURE — 17000 DESTRUCT PREMALG LESION: CPT | Mod: HCNC,59,S$GLB, | Performed by: STUDENT IN AN ORGANIZED HEALTH CARE EDUCATION/TRAINING PROGRAM

## 2022-08-31 PROCEDURE — 1159F MED LIST DOCD IN RCRD: CPT | Mod: HCNC,CPTII,S$GLB, | Performed by: STUDENT IN AN ORGANIZED HEALTH CARE EDUCATION/TRAINING PROGRAM

## 2022-08-31 PROCEDURE — 1160F PR REVIEW ALL MEDS BY PRESCRIBER/CLIN PHARMACIST DOCUMENTED: ICD-10-PCS | Mod: HCNC,CPTII,S$GLB, | Performed by: STUDENT IN AN ORGANIZED HEALTH CARE EDUCATION/TRAINING PROGRAM

## 2022-08-31 RX ORDER — MUPIROCIN 20 MG/G
OINTMENT TOPICAL
Qty: 60 G | Refills: 1 | Status: SHIPPED | OUTPATIENT
Start: 2022-08-31

## 2022-08-31 NOTE — PATIENT INSTRUCTIONS

## 2022-08-31 NOTE — PROGRESS NOTES
Patient Information  Name: Seamus Benitez  : 1945  MRN: [id number]     Referring Physician:  Dr. Johnson   Primary Care Physician:  Dr. CRISTINA Mathews MD   Date of Visit: 2022      Subjective:       Seamus Benitez is a 77 y.o. male who presents for   Chief Complaint   Patient presents with    Lesion     Skin lesion on the left cheek. He states he has a piece of glass in it.      HPI  Patient with new complaint of lesion(s)  Location: left cheek, right leg  Duration: 1 year  Symptoms: nonhealing sore  Relieving factors/Previous treatments: none    Patient was last seen:Visit date not found     Prior notes by myself reviewed.   Clinical documentation obtained by nursing staff reviewed.    Review of Systems   Skin:  Negative for itching and rash.      Objective:    Physical Exam   Constitutional: He appears well-developed and well-nourished. No distress.   Neurological: He is alert and oriented to person, place, and time. He is not disoriented.   Psychiatric: He has a normal mood and affect.   Skin:   Areas Examined (abnormalities noted in diagram):   Head / Face Inspection Performed  Neck Inspection Performed  RLE Inspected  LLE Inspection Performed                 Diagram Legend     Erythematous scaling macule/papule c/w actinic keratosis       Vascular papule c/w angioma      Pigmented verrucoid papule/plaque c/w seborrheic keratosis      Yellow umbilicated papule c/w sebaceous hyperplasia      Irregularly shaped tan macule c/w lentigo     1-2 mm smooth white papules consistent with Milia      Movable subcutaneous cyst with punctum c/w epidermal inclusion cyst      Subcutaneous movable cyst c/w pilar cyst      Firm pink to brown papule c/w dermatofibroma      Pedunculated fleshy papule(s) c/w skin tag(s)      Evenly pigmented macule c/w junctional nevus     Mildly variegated pigmented, slightly irregular-bordered macule c/w mildly atypical nevus      Flesh colored to evenly pigmented papule c/w  intradermal nevus       Pink pearly papule/plaque c/w basal cell carcinoma      Erythematous hyperkeratotic cursted plaque c/w SCC      Surgical scar with no sign of skin cancer recurrence      Open and closed comedones      Inflammatory papules and pustules      Verrucoid papule consistent consistent with wart     Erythematous eczematous patches and plaques     Dystrophic onycholytic nail with subungual debris c/w onychomycosis     Umbilicated papule    Erythematous-base heme-crusted tan verrucoid plaque consistent with inflamed seborrheic keratosis     Erythematous Silvery Scaling Plaque c/w Psoriasis     See annotation      No images are attached to the encounter or orders placed in the encounter.    [] Data reviewed  [] Independent review of test  [] Management discussed with another provider    Assessment / Plan:        Actinic keratosis  Cryosurgery Procedure Note    Verbal consent from the patient is obtained including, but not limited to, risk of hypopigmentation/hyperpigmentation, scar, recurrence of lesion. The patient is aware of the precancerous quality and need for treatment of these lesions. Liquid nitrogen cryosurgery is applied to the 1 actinic keratoses, as detailed in the physical exam, to produce a freeze injury. The patient is aware that blisters may form and is instructed on wound care with gentle cleansing and use of vaseline ointment to keep moist until healed. The patient is supplied a handout on cryosurgery and is instructed to call if lesions do not completely resolve.    Wound of skin  -     mupirocin (BACTROBAN) 2 % ointment; AAA bid for 10 days for skin infection  Dispense: 60 g; Refill: 1    Verruca vulgaris  Cryosurgery procedure note:    Verbal consent from the patient is obtained including, but not limited to, risk of hypopigmentation/hyperpigmentation, scar, recurrence of lesion. Liquid nitrogen cryosurgery is applied to 2 verruca, as detailed in the physical exam, to produce a  freeze injury. 2 consecutive freeze thaw cycles are applied to each verruca. The patient is aware that blisters (possibly blood blisters) may form.           LOS NUMBER AND COMPLEXITY OF PROBLEMS    COMPLEXITY OF DATA RISK TOTAL TIME (m)   65521  43482 [] 1 self-limited or minor problem [x] Minimal to none [] No treatment recommended or patient to monitor 15-29  10-19   18847  22466 Low  [] 2 or > self limited or minor problems  [] 1 stable chronic illness  [x] 1 acute, uncomplicated illness or injury Limited (2)  [] Prior external notes from each unique source  [] Review result of each unique test  [] Order each unique test []  Low  OTC medications, minor skin biopsy 30-44  20-29   52908  12756 Moderate  []  1 or > chronic illness with progression, exacerbation or SE of treatment  []  2 or more stable chronic illnesses  []  1 acute illness with systemic symptoms  []  1 acute complicated injury  []  1 undiagnosed new problem with uncertain prognosis Moderate (1/3 below)  []  3 or more data items        *Now includes assessment requiring independent historian  []  Independent interpretation of a test  []  Discuss management/test with another provider Moderate  [x]  Prescription drug mgmt  []  Minor surgery with risk discussed  []  Mgmt limited by social determinates 45-59  30-39   09455  77921 High  []  1 or more chronic illness with severe exacerbation, progression or SE of treatment  []  1 acute or chronic illness/injury that poses a threat to life or bodily function Extensive (2/3 below)  []  3 or more data items        *Now includes assessment requiring independent historian.  []  Independent interpretation of a test  []  Discuss management/test with another provider High  []  Major surgery with risk discussed  []  Drug therapy requiring intensive monitoring for toxicity  []  Hospitalization  []  Decision for DNR 60-74  40-54      No follow-ups on file.    Marcy Martins MD, FAAD  [facility]

## 2022-09-12 ENCOUNTER — TELEPHONE (OUTPATIENT)
Dept: SURGERY | Facility: CLINIC | Age: 77
End: 2022-09-12
Payer: MEDICARE

## 2022-09-12 DIAGNOSIS — N40.1 BENIGN PROSTATIC HYPERPLASIA WITH WEAK URINARY STREAM: ICD-10-CM

## 2022-09-12 DIAGNOSIS — R39.12 BENIGN PROSTATIC HYPERPLASIA WITH WEAK URINARY STREAM: ICD-10-CM

## 2022-09-12 RX ORDER — TAMSULOSIN HYDROCHLORIDE 0.4 MG/1
1 CAPSULE ORAL DAILY
Qty: 90 CAPSULE | Refills: 3 | Status: SHIPPED | OUTPATIENT
Start: 2022-09-12 | End: 2022-11-30 | Stop reason: SDUPTHER

## 2022-09-12 NOTE — TELEPHONE ENCOUNTER
----- Message from Tacos Don sent at 9/12/2022  8:10 AM CDT -----  Contact: hamida  .Type:  RX Refill Request    Who Called: hamida  Refill or New Rx:refill  RX Name and Strength:tamsulosin (FLOMAX) 0.4 mg Cap  How is the patient currently taking it? (ex. 1XDay):once a day  Is this a 30 day or 90 day RX:90 day  Preferred Pharmacy with phone number:[facility] [address] [address]  Phone: [phone] Fax: [phone]   Local or Mail Order:local  Ordering Provider:dr. licona  Would the patient rather a call back or a response via [facility]? Call back   Best Call Back Number:[phone]  Additional Information: n/a

## 2022-09-12 NOTE — TELEPHONE ENCOUNTER
Attempted to contact pt multiple times, no VM. Called in regards to patient wanting to postpone surgery and that he would call us to schedule it.

## 2022-09-12 NOTE — TELEPHONE ENCOUNTER
----- Message from Tacos Don sent at 9/12/2022  8:09 AM CDT -----  Contact: hamida Frazier is calling to cancel his procedure. Pt will call back to schedule.          Thanks  DD

## 2022-09-16 ENCOUNTER — OFFICE VISIT (OUTPATIENT)
Dept: UROLOGY | Facility: CLINIC | Age: 77
End: 2022-09-16
Payer: COMMERCIAL

## 2022-09-16 VITALS
HEART RATE: 69 BPM | DIASTOLIC BLOOD PRESSURE: 71 MMHG | BODY MASS INDEX: 25.67 KG/M2 | SYSTOLIC BLOOD PRESSURE: 122 MMHG | WEIGHT: 163.56 LBS | HEIGHT: 67 IN | RESPIRATION RATE: 18 BRPM

## 2022-09-16 DIAGNOSIS — R39.12 BENIGN PROSTATIC HYPERPLASIA WITH WEAK URINARY STREAM: Primary | ICD-10-CM

## 2022-09-16 DIAGNOSIS — N40.1 BENIGN PROSTATIC HYPERPLASIA WITH WEAK URINARY STREAM: Primary | ICD-10-CM

## 2022-09-16 PROCEDURE — 99214 PR OFFICE/OUTPT VISIT, EST, LEVL IV, 30-39 MIN: ICD-10-PCS | Mod: HCNC,S$GLB,, | Performed by: UROLOGY

## 2022-09-16 PROCEDURE — 3288F FALL RISK ASSESSMENT DOCD: CPT | Mod: HCNC,CPTII,S$GLB, | Performed by: UROLOGY

## 2022-09-16 PROCEDURE — 3078F PR MOST RECENT DIASTOLIC BLOOD PRESSURE < 80 MM HG: ICD-10-PCS | Mod: HCNC,CPTII,S$GLB, | Performed by: UROLOGY

## 2022-09-16 PROCEDURE — 1126F PR PAIN SEVERITY QUANTIFIED, NO PAIN PRESENT: ICD-10-PCS | Mod: HCNC,CPTII,S$GLB, | Performed by: UROLOGY

## 2022-09-16 PROCEDURE — 1101F PR PT FALLS ASSESS DOC 0-1 FALLS W/OUT INJ PAST YR: ICD-10-PCS | Mod: HCNC,CPTII,S$GLB, | Performed by: UROLOGY

## 2022-09-16 PROCEDURE — 1159F MED LIST DOCD IN RCRD: CPT | Mod: HCNC,CPTII,S$GLB, | Performed by: UROLOGY

## 2022-09-16 PROCEDURE — 3288F PR FALLS RISK ASSESSMENT DOCUMENTED: ICD-10-PCS | Mod: HCNC,CPTII,S$GLB, | Performed by: UROLOGY

## 2022-09-16 PROCEDURE — 3074F SYST BP LT 130 MM HG: CPT | Mod: HCNC,CPTII,S$GLB, | Performed by: UROLOGY

## 2022-09-16 PROCEDURE — 99999 PR PBB SHADOW E&M-EST. PATIENT-LVL IV: ICD-10-PCS | Mod: PBBFAC,HCNC,, | Performed by: UROLOGY

## 2022-09-16 PROCEDURE — 99999 PR PBB SHADOW E&M-EST. PATIENT-LVL IV: CPT | Mod: PBBFAC,HCNC,, | Performed by: UROLOGY

## 2022-09-16 PROCEDURE — 3074F PR MOST RECENT SYSTOLIC BLOOD PRESSURE < 130 MM HG: ICD-10-PCS | Mod: HCNC,CPTII,S$GLB, | Performed by: UROLOGY

## 2022-09-16 PROCEDURE — 1160F PR REVIEW ALL MEDS BY PRESCRIBER/CLIN PHARMACIST DOCUMENTED: ICD-10-PCS | Mod: HCNC,CPTII,S$GLB, | Performed by: UROLOGY

## 2022-09-16 PROCEDURE — 1159F PR MEDICATION LIST DOCUMENTED IN MEDICAL RECORD: ICD-10-PCS | Mod: HCNC,CPTII,S$GLB, | Performed by: UROLOGY

## 2022-09-16 PROCEDURE — 1160F RVW MEDS BY RX/DR IN RCRD: CPT | Mod: HCNC,CPTII,S$GLB, | Performed by: UROLOGY

## 2022-09-16 PROCEDURE — 99214 OFFICE O/P EST MOD 30 MIN: CPT | Mod: HCNC,S$GLB,, | Performed by: UROLOGY

## 2022-09-16 PROCEDURE — 1126F AMNT PAIN NOTED NONE PRSNT: CPT | Mod: HCNC,CPTII,S$GLB, | Performed by: UROLOGY

## 2022-09-16 PROCEDURE — 3078F DIAST BP <80 MM HG: CPT | Mod: HCNC,CPTII,S$GLB, | Performed by: UROLOGY

## 2022-09-16 PROCEDURE — 1101F PT FALLS ASSESS-DOCD LE1/YR: CPT | Mod: HCNC,CPTII,S$GLB, | Performed by: UROLOGY

## 2022-09-16 NOTE — PROGRESS NOTES
Chief Complaint: Elevated PSA    HPI:   09/16/2022 - returns today for follow-up, continues to have left inguinal discomfort radiates down to his testicle, was going to have his hernia repaired but decided against it and canceled the surgery, medications for BPH still working but patient notes that he is tired of taking them, most recent CT shows a 140 g prostate    07/07/2022 - patient returns today for follow-up, notes new onset low back pain/flank pain on the left that radiates down to his left testicle, denies any voiding dysfunction, denies any fevers or gross hematuria, has never had a kidney stone previously    08/12/2021 - patient returns today for follow-up, PSA down by about 1/2 from prior to starting finasteride, voiding well on dual medical therapy, notes some intermittent ED but not bothered, has had 2 prior biopsies, both were negative, no further issues with retention    2/8/21: Voiding a lot better after finasteride, PVR 26ml.  Reviewed history in detail. Discussed prospect of CaP.  8/26/20: Cysto today confirms severe BPH.  CT Urogram shows sig BPH. Prostate is measured at 97 gm, likely a little bigger allowing for technique.  7/29/20: Martinez out last week, stream is weak sometimes, others okay.  Macrobid complete.  Hematuria yesterday.  7/23/20: Stopped the flomax and never really started the finasteride and went into retention last week.  Flomax daily since martinez placed in ER.  Also taking the finasteride.  1/8/20: Asks about ED meds; discussed.  Emptying well now better than a year ago. Reviewed history in detail. Not sure he started finasteride.   1/2/19: No sig problems.  Feeling fine.  Had an episode of mild retention and went to the ER where he was given flomax and he voids better on it.  Takes it every 2d or so.  4/12/17: No problems at all.  4/11/16: No PSA done will order today.  Doing well.  Says that for the last four months when he drinks a bunch of beer he gets bilateral flank pain. No  hematuria.  3/4/15: PSA essentially unchanged.  Oxybutinin and caffeine cessation have improved OAB but not even needing the oxybutinin lately.  10/10/14: Pt states that after a couple of beers his testicles start to get a dull pain and he also has some SP pressure before voiding.  Then he voids and it is relieved.  Fine during the day.  Really a problem when he drinks too much.  Good stream.  Doesn't hold too long.  Drinks coffee in AM, coke or two during the day, VO/coke,   9/3/14: Pt doing well from cyst excision.  PSA 8.4 on recheck after 14d cipro for PSA 10.  Left testicle the same.  7/9/14: 67 yo man has a dull left testicular pain when he goes to bed but not during most of the day.  Keeps him from sleeping a little bit.  No hematuria.  No urolithiasis history.  Saw Dr. Haskins and another urologist for BPH and elevated PSA.  Had two negative prostate biopsies last 4 years ago and he was advised to have a third and he declined.  No urinary bother.  No constipation.  No hernias or related surgeries.    Allergies:  Eucrisa [crisaborole]    Medications:  has a current medication list which includes the following prescription(s): doxepin, doxycycline, finasteride, halobetasol, levocetirizine, mometasone 0.1%, mupirocin, pravastatin, tamsulosin, and tadalafil.    Review of Systems:  General: No fever, chills  Skin: No rashes  Chest:  Denies cough and sputum production  Heart: Denies chest pain  Resp: Denies dyspnea  Abdomen: Denies diarrhea, abdominal pain, hematemesis, or blood in stool.  Musculoskeletal: No joint stiffness or swelling. Denies back pain.  : see HPI  Neuro: no dizziness or weakness      PMH:   has a past medical history of Eczema and Elevated PSA.    PSH:   has a past surgical history that includes Appendectomy and left foot surgery.    FamHx: family history includes Cancer in his father.    SocHx:  reports that he has quit smoking. He has never been exposed to tobacco smoke. He has never used  smokeless tobacco. He reports current alcohol use. He reports that he does not use drugs.      Physical Exam:  Vitals:    09/16/22 1525   BP: 122/71   Pulse: 69   Resp: 18   General: awake, alert, cooperative  Head: NC/AT  Ears: external ears normal  Eyes: sclera normal  Lungs: normal inspiration, NAD  Heart: well-perfused  Abdomen: Soft, NT, ND  : Normal circ'd phallus, meatus normal in size and position, BL testicles palpable, no masses, nontender, no abnormalities of epididymi  Lymphatic: groin nodes negative  Skin: The skin is warm and dry  Ext: No c/c/e.  Neuro: grossly intact, AOx3      Labs/Studies:   Lab Results   Component Value Date    WBC 6.67 08/16/2022    HGB 16.0 08/16/2022    HCT 47.8 08/16/2022     08/16/2022     08/16/2022    K 4.2 08/16/2022     08/16/2022    CREATININE 0.8 08/16/2022    BUN 14 08/16/2022    CO2 25 08/16/2022    PSA 4.9 (H) 02/08/2021    CHOL 165 08/16/2022    TRIG 80 08/16/2022    HDL 33 (L) 08/16/2022    ALT 14 08/16/2022    AST 15 08/16/2022     PSA    7/29/14: 8.4    3/3/15: 9.2    4/16: 10.2    4/17: 9.3    12/19: 12.6    1/20: 11.7     8/21 - 6.0    Bladder Scan performed in office:     10/14: PVR 30 ml.    Impression/Plan:   Left flank pain - no evidence of stones on CT 7/22, pain most likely MSK related    BPH - continue Flomax and finasteride, discussed surgical options, either aggressive TURP or simple prostatectomy could be considered, however reviewed that simple prostatectomy likely would have longer duration but has higher risks, he will consider    Elevated PSA - 2 prior negative biopsies, would not rebiopsy unless he has a positive MRI, PSA today, call with results    CLAUDIA - Cialis p.r.n., refill provided    Eric Panda MD

## 2022-11-09 ENCOUNTER — TELEPHONE (OUTPATIENT)
Dept: INTERNAL MEDICINE | Facility: CLINIC | Age: 77
End: 2022-11-09
Payer: MEDICARE

## 2022-11-30 DIAGNOSIS — N40.1 BENIGN PROSTATIC HYPERPLASIA WITH WEAK URINARY STREAM: ICD-10-CM

## 2022-11-30 DIAGNOSIS — R39.12 BENIGN PROSTATIC HYPERPLASIA WITH WEAK URINARY STREAM: ICD-10-CM

## 2022-11-30 RX ORDER — FINASTERIDE 5 MG/1
5 TABLET, FILM COATED ORAL DAILY
Qty: 90 TABLET | Refills: 3 | Status: SHIPPED | OUTPATIENT
Start: 2022-11-30 | End: 2023-11-30

## 2022-11-30 RX ORDER — TAMSULOSIN HYDROCHLORIDE 0.4 MG/1
1 CAPSULE ORAL DAILY
Qty: 90 CAPSULE | Refills: 3 | Status: SHIPPED | OUTPATIENT
Start: 2022-11-30 | End: 2023-06-27

## 2022-12-27 ENCOUNTER — OFFICE VISIT (OUTPATIENT)
Dept: UROLOGY | Facility: CLINIC | Age: 77
End: 2022-12-27
Payer: MEDICARE

## 2022-12-27 VITALS
HEART RATE: 80 BPM | WEIGHT: 163 LBS | BODY MASS INDEX: 25.53 KG/M2 | DIASTOLIC BLOOD PRESSURE: 71 MMHG | SYSTOLIC BLOOD PRESSURE: 126 MMHG

## 2022-12-27 DIAGNOSIS — R97.20 ELEVATED PSA: Primary | ICD-10-CM

## 2022-12-27 PROCEDURE — 1160F RVW MEDS BY RX/DR IN RCRD: CPT | Mod: HCNC,CPTII,S$GLB, | Performed by: UROLOGY

## 2022-12-27 PROCEDURE — 1160F PR REVIEW ALL MEDS BY PRESCRIBER/CLIN PHARMACIST DOCUMENTED: ICD-10-PCS | Mod: HCNC,CPTII,S$GLB, | Performed by: UROLOGY

## 2022-12-27 PROCEDURE — 99214 OFFICE O/P EST MOD 30 MIN: CPT | Mod: HCNC,S$GLB,, | Performed by: UROLOGY

## 2022-12-27 PROCEDURE — 1101F PR PT FALLS ASSESS DOC 0-1 FALLS W/OUT INJ PAST YR: ICD-10-PCS | Mod: HCNC,CPTII,S$GLB, | Performed by: UROLOGY

## 2022-12-27 PROCEDURE — 3078F DIAST BP <80 MM HG: CPT | Mod: HCNC,CPTII,S$GLB, | Performed by: UROLOGY

## 2022-12-27 PROCEDURE — 1101F PT FALLS ASSESS-DOCD LE1/YR: CPT | Mod: HCNC,CPTII,S$GLB, | Performed by: UROLOGY

## 2022-12-27 PROCEDURE — 3288F FALL RISK ASSESSMENT DOCD: CPT | Mod: HCNC,CPTII,S$GLB, | Performed by: UROLOGY

## 2022-12-27 PROCEDURE — 3074F PR MOST RECENT SYSTOLIC BLOOD PRESSURE < 130 MM HG: ICD-10-PCS | Mod: HCNC,CPTII,S$GLB, | Performed by: UROLOGY

## 2022-12-27 PROCEDURE — 1159F MED LIST DOCD IN RCRD: CPT | Mod: HCNC,CPTII,S$GLB, | Performed by: UROLOGY

## 2022-12-27 PROCEDURE — 99999 PR PBB SHADOW E&M-EST. PATIENT-LVL III: ICD-10-PCS | Mod: PBBFAC,HCNC,, | Performed by: UROLOGY

## 2022-12-27 PROCEDURE — 3288F PR FALLS RISK ASSESSMENT DOCUMENTED: ICD-10-PCS | Mod: HCNC,CPTII,S$GLB, | Performed by: UROLOGY

## 2022-12-27 PROCEDURE — 99999 PR PBB SHADOW E&M-EST. PATIENT-LVL III: CPT | Mod: PBBFAC,HCNC,, | Performed by: UROLOGY

## 2022-12-27 PROCEDURE — 1126F PR PAIN SEVERITY QUANTIFIED, NO PAIN PRESENT: ICD-10-PCS | Mod: HCNC,CPTII,S$GLB, | Performed by: UROLOGY

## 2022-12-27 PROCEDURE — 99214 PR OFFICE/OUTPT VISIT, EST, LEVL IV, 30-39 MIN: ICD-10-PCS | Mod: HCNC,S$GLB,, | Performed by: UROLOGY

## 2022-12-27 PROCEDURE — 1126F AMNT PAIN NOTED NONE PRSNT: CPT | Mod: HCNC,CPTII,S$GLB, | Performed by: UROLOGY

## 2022-12-27 PROCEDURE — 3074F SYST BP LT 130 MM HG: CPT | Mod: HCNC,CPTII,S$GLB, | Performed by: UROLOGY

## 2022-12-27 PROCEDURE — 1159F PR MEDICATION LIST DOCUMENTED IN MEDICAL RECORD: ICD-10-PCS | Mod: HCNC,CPTII,S$GLB, | Performed by: UROLOGY

## 2022-12-27 PROCEDURE — 3078F PR MOST RECENT DIASTOLIC BLOOD PRESSURE < 80 MM HG: ICD-10-PCS | Mod: HCNC,CPTII,S$GLB, | Performed by: UROLOGY

## 2022-12-27 NOTE — PROGRESS NOTES
Chief Complaint: Elevated PSA    HPI:   12/27/2022 - patient returns today for follow-up, no issues in the interim, voiding stable, still taking the Flomax and finasteride and working well for, no gross hematuria or dysuria, has not had occasion to use the Cialis recently    09/16/2022 - returns today for follow-up, continues to have left inguinal discomfort radiates down to his testicle, was going to have his hernia repaired but decided against it and canceled the surgery, medications for BPH still working but patient notes that he is tired of taking them, most recent CT shows a 140 g prostate    07/07/2022 - patient returns today for follow-up, notes new onset low back pain/flank pain on the left that radiates down to his left testicle, denies any voiding dysfunction, denies any fevers or gross hematuria, has never had a kidney stone previously    08/12/2021 - patient returns today for follow-up, PSA down by about 1/2 from prior to starting finasteride, voiding well on dual medical therapy, notes some intermittent ED but not bothered, has had 2 prior biopsies, both were negative, no further issues with retention    2/8/21: Voiding a lot better after finasteride, PVR 26ml.  Reviewed history in detail. Discussed prospect of CaP.  8/26/20: Cysto today confirms severe BPH.  CT Urogram shows sig BPH. Prostate is measured at 97 gm, likely a little bigger allowing for technique.  7/29/20: Martinez out last week, stream is weak sometimes, others okay.  Macrobid complete.  Hematuria yesterday.  7/23/20: Stopped the flomax and never really started the finasteride and went into retention last week.  Flomax daily since martinez placed in ER.  Also taking the finasteride.  1/8/20: Asks about ED meds; discussed.  Emptying well now better than a year ago. Reviewed history in detail. Not sure he started finasteride.   1/2/19: No sig problems.  Feeling fine.  Had an episode of mild retention and went to the ER where he was given flomax  and he voids better on it.  Takes it every 2d or so.  4/12/17: No problems at all.  4/11/16: No PSA done will order today.  Doing well.  Says that for the last four months when he drinks a bunch of beer he gets bilateral flank pain. No hematuria.  3/4/15: PSA essentially unchanged.  Oxybutinin and caffeine cessation have improved OAB but not even needing the oxybutinin lately.  10/10/14: Pt states that after a couple of beers his testicles start to get a dull pain and he also has some SP pressure before voiding.  Then he voids and it is relieved.  Fine during the day.  Really a problem when he drinks too much.  Good stream.  Doesn't hold too long.  Drinks coffee in AM, coke or two during the day, VO/coke,   9/3/14: Pt doing well from cyst excision.  PSA 8.4 on recheck after 14d cipro for PSA 10.  Left testicle the same.  7/9/14: 69 yo man has a dull left testicular pain when he goes to bed but not during most of the day.  Keeps him from sleeping a little bit.  No hematuria.  No urolithiasis history.  Saw Dr. Haskins and another urologist for BPH and elevated PSA.  Had two negative prostate biopsies last 4 years ago and he was advised to have a third and he declined.  No urinary bother.  No constipation.  No hernias or related surgeries.    Allergies:  Eucrisa [crisaborole]    Medications:  has a current medication list which includes the following prescription(s): doxepin, finasteride, halobetasol, levocetirizine, mometasone 0.1%, mupirocin, pravastatin, tamsulosin, doxycycline, and tadalafil.    Review of Systems:  General: No fever, chills  Skin: No rashes  Chest:  Denies cough and sputum production  Heart: Denies chest pain  Resp: Denies dyspnea  Abdomen: Denies diarrhea, abdominal pain, hematemesis, or blood in stool.  Musculoskeletal: No joint stiffness or swelling. Denies back pain.  : see HPI  Neuro: no dizziness or weakness      PMH:   has a past medical history of Eczema and Elevated PSA.    PSH:   has a  past surgical history that includes Appendectomy and left foot surgery.    FamHx: family history includes Cancer in his father.    SocHx:  reports that he has quit smoking. He has never been exposed to tobacco smoke. He has never used smokeless tobacco. He reports current alcohol use. He reports that he does not use drugs.      Physical Exam:  Vitals:    12/27/22 1445   BP: 126/71   Pulse: 80   General: awake, alert, cooperative  Head: NC/AT  Ears: external ears normal  Eyes: sclera normal  Lungs: normal inspiration, NAD  Heart: well-perfused  Abdomen: Soft, NT, ND  : Normal circ'd phallus, meatus normal in size and position, BL testicles palpable, no masses, nontender, no abnormalities of epididymi  Lymphatic: groin nodes negative  Skin: The skin is warm and dry  Ext: No c/c/e.  Neuro: grossly intact, AOx3      Labs/Studies:   Lab Results   Component Value Date    WBC 6.67 08/16/2022    HGB 16.0 08/16/2022    HCT 47.8 08/16/2022     08/16/2022     08/16/2022    K 4.2 08/16/2022     08/16/2022    CREATININE 0.8 08/16/2022    BUN 14 08/16/2022    CO2 25 08/16/2022    PSA 4.9 (H) 02/08/2021    CHOL 165 08/16/2022    TRIG 80 08/16/2022    HDL 33 (L) 08/16/2022    ALT 14 08/16/2022    AST 15 08/16/2022     PSA    7/29/14: 8.4    3/3/15: 9.2    4/16: 10.2    4/17: 9.3    12/19: 12.6    1/20: 11.7     8/21 - 6.0    Bladder Scan performed in office:     10/14: PVR 30 ml.    Impression/Plan:   Left flank pain - no evidence of stones on CT 7/22, pain most likely MSK related    BPH - continue Flomax and finasteride, discussed surgical options, either aggressive TURP or simple prostatectomy could be considered, however reviewed that simple prostatectomy likely would have longer duration but has higher risks, he will consider    Elevated PSA - 2 prior negative biopsies, would not rebiopsy unless he has a positive MRI, f/u 6 months with PSA    ED - Simone Panda MD

## 2023-03-29 RX ORDER — TAMSULOSIN HYDROCHLORIDE 0.4 MG/1
1 CAPSULE ORAL DAILY
Qty: 90 CAPSULE | Refills: 3 | Status: SHIPPED | OUTPATIENT
Start: 2023-03-29 | End: 2023-06-27 | Stop reason: SDUPTHER

## 2023-03-29 RX ORDER — FINASTERIDE 5 MG/1
5 TABLET, FILM COATED ORAL DAILY
Qty: 90 TABLET | Refills: 3 | Status: SHIPPED | OUTPATIENT
Start: 2023-03-29 | End: 2024-01-17 | Stop reason: SDUPTHER

## 2023-03-31 ENCOUNTER — TELEPHONE (OUTPATIENT)
Dept: UROLOGY | Facility: CLINIC | Age: 78
End: 2023-03-31
Payer: MEDICARE

## 2023-06-27 ENCOUNTER — OFFICE VISIT (OUTPATIENT)
Dept: UROLOGY | Facility: CLINIC | Age: 78
End: 2023-06-27
Payer: MEDICARE

## 2023-06-27 ENCOUNTER — LAB VISIT (OUTPATIENT)
Dept: LAB | Facility: HOSPITAL | Age: 78
End: 2023-06-27
Attending: UROLOGY
Payer: MEDICARE

## 2023-06-27 VITALS
SYSTOLIC BLOOD PRESSURE: 117 MMHG | HEART RATE: 66 BPM | BODY MASS INDEX: 25.88 KG/M2 | HEIGHT: 67 IN | DIASTOLIC BLOOD PRESSURE: 72 MMHG | WEIGHT: 164.88 LBS | RESPIRATION RATE: 18 BRPM

## 2023-06-27 DIAGNOSIS — R97.20 ELEVATED PSA: Primary | ICD-10-CM

## 2023-06-27 DIAGNOSIS — N40.1 BENIGN PROSTATIC HYPERPLASIA WITH WEAK URINARY STREAM: ICD-10-CM

## 2023-06-27 DIAGNOSIS — R39.12 BENIGN PROSTATIC HYPERPLASIA WITH WEAK URINARY STREAM: ICD-10-CM

## 2023-06-27 DIAGNOSIS — R97.20 ELEVATED PSA: ICD-10-CM

## 2023-06-27 PROCEDURE — 1160F RVW MEDS BY RX/DR IN RCRD: CPT | Mod: CPTII,S$GLB,, | Performed by: UROLOGY

## 2023-06-27 PROCEDURE — 1126F PR PAIN SEVERITY QUANTIFIED, NO PAIN PRESENT: ICD-10-PCS | Mod: CPTII,S$GLB,, | Performed by: UROLOGY

## 2023-06-27 PROCEDURE — 3288F PR FALLS RISK ASSESSMENT DOCUMENTED: ICD-10-PCS | Mod: CPTII,S$GLB,, | Performed by: UROLOGY

## 2023-06-27 PROCEDURE — 1101F PT FALLS ASSESS-DOCD LE1/YR: CPT | Mod: CPTII,S$GLB,, | Performed by: UROLOGY

## 2023-06-27 PROCEDURE — 1159F PR MEDICATION LIST DOCUMENTED IN MEDICAL RECORD: ICD-10-PCS | Mod: CPTII,S$GLB,, | Performed by: UROLOGY

## 2023-06-27 PROCEDURE — 99214 PR OFFICE/OUTPT VISIT, EST, LEVL IV, 30-39 MIN: ICD-10-PCS | Mod: S$GLB,,, | Performed by: UROLOGY

## 2023-06-27 PROCEDURE — 3074F PR MOST RECENT SYSTOLIC BLOOD PRESSURE < 130 MM HG: ICD-10-PCS | Mod: CPTII,S$GLB,, | Performed by: UROLOGY

## 2023-06-27 PROCEDURE — 1160F PR REVIEW ALL MEDS BY PRESCRIBER/CLIN PHARMACIST DOCUMENTED: ICD-10-PCS | Mod: CPTII,S$GLB,, | Performed by: UROLOGY

## 2023-06-27 PROCEDURE — 99214 OFFICE O/P EST MOD 30 MIN: CPT | Mod: S$GLB,,, | Performed by: UROLOGY

## 2023-06-27 PROCEDURE — 36415 COLL VENOUS BLD VENIPUNCTURE: CPT | Performed by: UROLOGY

## 2023-06-27 PROCEDURE — 84153 ASSAY OF PSA TOTAL: CPT | Performed by: UROLOGY

## 2023-06-27 PROCEDURE — 99999 PR PBB SHADOW E&M-EST. PATIENT-LVL III: ICD-10-PCS | Mod: PBBFAC,,, | Performed by: UROLOGY

## 2023-06-27 PROCEDURE — 3074F SYST BP LT 130 MM HG: CPT | Mod: CPTII,S$GLB,, | Performed by: UROLOGY

## 2023-06-27 PROCEDURE — 3078F PR MOST RECENT DIASTOLIC BLOOD PRESSURE < 80 MM HG: ICD-10-PCS | Mod: CPTII,S$GLB,, | Performed by: UROLOGY

## 2023-06-27 PROCEDURE — 1126F AMNT PAIN NOTED NONE PRSNT: CPT | Mod: CPTII,S$GLB,, | Performed by: UROLOGY

## 2023-06-27 PROCEDURE — 99999 PR PBB SHADOW E&M-EST. PATIENT-LVL III: CPT | Mod: PBBFAC,,, | Performed by: UROLOGY

## 2023-06-27 PROCEDURE — 3288F FALL RISK ASSESSMENT DOCD: CPT | Mod: CPTII,S$GLB,, | Performed by: UROLOGY

## 2023-06-27 PROCEDURE — 3078F DIAST BP <80 MM HG: CPT | Mod: CPTII,S$GLB,, | Performed by: UROLOGY

## 2023-06-27 PROCEDURE — 1101F PR PT FALLS ASSESS DOC 0-1 FALLS W/OUT INJ PAST YR: ICD-10-PCS | Mod: CPTII,S$GLB,, | Performed by: UROLOGY

## 2023-06-27 PROCEDURE — 1159F MED LIST DOCD IN RCRD: CPT | Mod: CPTII,S$GLB,, | Performed by: UROLOGY

## 2023-06-27 RX ORDER — TAMSULOSIN HYDROCHLORIDE 0.4 MG/1
1 CAPSULE ORAL DAILY
Qty: 90 CAPSULE | Refills: 3 | Status: SHIPPED | OUTPATIENT
Start: 2023-06-27 | End: 2023-09-19

## 2023-06-27 NOTE — PROGRESS NOTES
Chief Complaint: Elevated PSA    HPI:   06/27/2023 - patient returns today for follow-up, no new urologic issues in the interim, no gross hematuria or dysuria, due for PSA today    12/27/2022 - patient returns today for follow-up, no issues in the interim, voiding stable, still taking the Flomax and finasteride and working well for, no gross hematuria or dysuria, has not had occasion to use the Cialis recently    09/16/2022 - returns today for follow-up, continues to have left inguinal discomfort radiates down to his testicle, was going to have his hernia repaired but decided against it and canceled the surgery, medications for BPH still working but patient notes that he is tired of taking them, most recent CT shows a 140 g prostate    07/07/2022 - patient returns today for follow-up, notes new onset low back pain/flank pain on the left that radiates down to his left testicle, denies any voiding dysfunction, denies any fevers or gross hematuria, has never had a kidney stone previously    08/12/2021 - patient returns today for follow-up, PSA down by about 1/2 from prior to starting finasteride, voiding well on dual medical therapy, notes some intermittent ED but not bothered, has had 2 prior biopsies, both were negative, no further issues with retention    2/8/21: Voiding a lot better after finasteride, PVR 26ml.  Reviewed history in detail. Discussed prospect of CaP.  8/26/20: Cysto today confirms severe BPH.  CT Urogram shows sig BPH. Prostate is measured at 97 gm, likely a little bigger allowing for technique.  7/29/20: Martinez out last week, stream is weak sometimes, others okay.  Macrobid complete.  Hematuria yesterday.  7/23/20: Stopped the flomax and never really started the finasteride and went into retention last week.  Flomax daily since martinez placed in ER.  Also taking the finasteride.  1/8/20: Asks about ED meds; discussed.  Emptying well now better than a year ago. Reviewed history in detail. Not sure he  started finasteride.   1/2/19: No sig problems.  Feeling fine.  Had an episode of mild retention and went to the ER where he was given flomax and he voids better on it.  Takes it every 2d or so.  4/12/17: No problems at all.  4/11/16: No PSA done will order today.  Doing well.  Says that for the last four months when he drinks a bunch of beer he gets bilateral flank pain. No hematuria.  3/4/15: PSA essentially unchanged.  Oxybutinin and caffeine cessation have improved OAB but not even needing the oxybutinin lately.  10/10/14: Pt states that after a couple of beers his testicles start to get a dull pain and he also has some SP pressure before voiding.  Then he voids and it is relieved.  Fine during the day.  Really a problem when he drinks too much.  Good stream.  Doesn't hold too long.  Drinks coffee in AM, coke or two during the day, VO/coke,   9/3/14: Pt doing well from cyst excision.  PSA 8.4 on recheck after 14d cipro for PSA 10.  Left testicle the same.  7/9/14: 67 yo man has a dull left testicular pain when he goes to bed but not during most of the day.  Keeps him from sleeping a little bit.  No hematuria.  No urolithiasis history.  Saw Dr. Haskins and another urologist for BPH and elevated PSA.  Had two negative prostate biopsies last 4 years ago and he was advised to have a third and he declined.  No urinary bother.  No constipation.  No hernias or related surgeries.    Allergies:  Eucrisa [crisaborole]    Medications:  has a current medication list which includes the following prescription(s): doxepin, finasteride, finasteride, halobetasol, levocetirizine, mometasone 0.1%, mupirocin, pravastatin, tamsulosin, tamsulosin, doxycycline, and tadalafil.    Review of Systems:  General: No fever, chills  Skin: No rashes  Chest:  Denies cough and sputum production  Heart: Denies chest pain  Resp: Denies dyspnea  Abdomen: Denies diarrhea, abdominal pain, hematemesis, or blood in stool.  Musculoskeletal: No joint  stiffness or swelling. Denies back pain.  : see HPI  Neuro: no dizziness or weakness      PMH:   has a past medical history of Eczema and Elevated PSA.    PSH:   has a past surgical history that includes Appendectomy and left foot surgery.    FamHx: family history includes Cancer in his father.    SocHx:  reports that he has quit smoking. He has never been exposed to tobacco smoke. He has never used smokeless tobacco. He reports current alcohol use. He reports that he does not use drugs.      Physical Exam:  Vitals:    06/27/23 1112   BP: 117/72   Pulse: 66   Resp: 18   General: awake, alert, cooperative  Head: NC/AT  Ears: external ears normal  Eyes: sclera normal  Lungs: normal inspiration, NAD  Heart: well-perfused  Abdomen: Soft, NT, ND  : Normal circ'd phallus, meatus normal in size and position, BL testicles palpable, no masses, nontender, no abnormalities of epididymi  Lymphatic: groin nodes negative  Skin: The skin is warm and dry  Ext: No c/c/e.  Neuro: grossly intact, AOx3      Labs/Studies:   Lab Results   Component Value Date    WBC 6.67 08/16/2022    HGB 16.0 08/16/2022    HCT 47.8 08/16/2022     08/16/2022     08/16/2022    K 4.2 08/16/2022     08/16/2022    CREATININE 0.8 08/16/2022    BUN 14 08/16/2022    CO2 25 08/16/2022    PSA 4.9 (H) 02/08/2021    CHOL 165 08/16/2022    TRIG 80 08/16/2022    HDL 33 (L) 08/16/2022    ALT 14 08/16/2022    AST 15 08/16/2022     PSA    7/29/14: 8.4    3/3/15: 9.2    4/16: 10.2    4/17: 9.3    12/19: 12.6    1/20: 11.7     8/21 - 6.0    Bladder Scan performed in office:     10/14: PVR 30 ml.    Impression/Plan:   Left flank pain - no evidence of stones on CT 7/22, pain most likely MSK related    BPH - continue Flomax and finasteride, discussed surgical options, either aggressive TURP or simple prostatectomy could be considered, however reviewed that simple prostatectomy likely would have longer duration but has higher risks, he will  consider    Elevated PSA - 2 prior negative biopsies, would not rebiopsy unless he has a positive MRI, f/u 6 months with PSA    CLAUDIA - Simone Panda MD

## 2023-06-28 LAB — COMPLEXED PSA SERPL-MCNC: 4.5 NG/ML (ref 0–4)

## 2023-09-18 DIAGNOSIS — N40.1 BENIGN PROSTATIC HYPERPLASIA WITH WEAK URINARY STREAM: ICD-10-CM

## 2023-09-18 DIAGNOSIS — R39.12 BENIGN PROSTATIC HYPERPLASIA WITH WEAK URINARY STREAM: ICD-10-CM

## 2023-09-19 RX ORDER — TAMSULOSIN HYDROCHLORIDE 0.4 MG/1
1 CAPSULE ORAL
Qty: 90 CAPSULE | Refills: 3 | Status: SHIPPED | OUTPATIENT
Start: 2023-09-19 | End: 2024-01-17 | Stop reason: SDUPTHER

## 2024-01-17 ENCOUNTER — TELEPHONE (OUTPATIENT)
Dept: UROLOGY | Facility: CLINIC | Age: 79
End: 2024-01-17
Payer: MEDICARE

## 2024-01-17 DIAGNOSIS — N40.1 BENIGN PROSTATIC HYPERPLASIA WITH WEAK URINARY STREAM: ICD-10-CM

## 2024-01-17 DIAGNOSIS — R39.12 BENIGN PROSTATIC HYPERPLASIA WITH WEAK URINARY STREAM: ICD-10-CM

## 2024-01-17 NOTE — TELEPHONE ENCOUNTER
Called the patient, after verification of name and , the patient was informed  of new appt time and date he agreed to appt time and date and location. Pt also informed that I will send a message to Dr licona to get his meds refilled. Pt voiced understanding       ----- Message from Ronal Wood sent at 2024  8:23 AM CST -----  Contact: hamida  Type:  Sooner Apoointment Request  Name of Caller:Hamida  When is the first available appointment?2024  Symptoms:5 month f/u  Would the patient rather a call back or a response via [facility]? Call back  Best Call Back Number:[phone]  Additional Information:

## 2024-01-17 NOTE — TELEPHONE ENCOUNTER
Message for a refill sent to provider       ----- Message from Ronal Wood sent at 1/17/2024  8:20 AM CST -----  Contact: hamida  Type:  RX Refill Request  Who Called: hamida  Refill or New Rx:refill  RX Name and Strength:finasteride (PROSCAR) 5 mg tablet   How is the patient currently taking it? (ex. 1XDay):unknown  Is this a 30 day or 90 day RX:unknown  Preferred Pharmacy with phone number:  [facility] [place] [address]  Phone: [phone] Fax: [phone]  Local or Mail Order:local  Ordering Provider:dr licona  Would the patient rather a call back or a response via [facility]? Call back  Best Call Back Number:[phone]  Additional Information:     Type:  RX Refill Request  Who Called: hamida  Refill or New Rx:refill  RX Name and Strength:tamsulosin (FLOMAX) 0.4 mg Cap    How is the patient currently taking it? (ex. 1XDay):unknown  Is this a 30 day or 90 day RX:unknown  Preferred Pharmacy with phone number:  [facility] [address] [address]  Phone: [phone] Fax: [phone]  Local or Mail Order:local  Ordering Provider:dr licona  Would the patient rather a call back or a response via [facility]? Call back  Best Call Back Number:[phone]  Additional Information:

## 2024-01-23 RX ORDER — FINASTERIDE 5 MG/1
5 TABLET, FILM COATED ORAL DAILY
Qty: 90 TABLET | Refills: 3 | Status: SHIPPED | OUTPATIENT
Start: 2024-01-23 | End: 2025-01-22

## 2024-01-23 RX ORDER — TAMSULOSIN HYDROCHLORIDE 0.4 MG/1
1 CAPSULE ORAL DAILY
Qty: 90 CAPSULE | Refills: 3 | Status: SHIPPED | OUTPATIENT
Start: 2024-01-23

## 2024-08-30 ENCOUNTER — OFFICE VISIT (OUTPATIENT)
Dept: UROLOGY | Facility: CLINIC | Age: 79
End: 2024-08-30
Payer: MEDICARE

## 2024-08-30 VITALS
DIASTOLIC BLOOD PRESSURE: 77 MMHG | BODY MASS INDEX: 25.83 KG/M2 | WEIGHT: 164.88 LBS | HEART RATE: 73 BPM | SYSTOLIC BLOOD PRESSURE: 121 MMHG

## 2024-08-30 DIAGNOSIS — R39.12 BENIGN PROSTATIC HYPERPLASIA WITH WEAK URINARY STREAM: Primary | ICD-10-CM

## 2024-08-30 DIAGNOSIS — N40.1 BENIGN PROSTATIC HYPERPLASIA WITH WEAK URINARY STREAM: Primary | ICD-10-CM

## 2024-08-30 PROCEDURE — 99999 PR PBB SHADOW E&M-EST. PATIENT-LVL III: CPT | Mod: PBBFAC,,, | Performed by: UROLOGY

## 2024-08-30 NOTE — PROGRESS NOTES
Chief Complaint: Elevated PSA    HPI:   08/30/2024 - returns today for follow-up, had an episode of retention while he was on a cruise and had to have Sims catheter placed while on the ship, is now considering an outlet procedure    06/27/2023 - patient returns today for follow-up, no new urologic issues in the interim, no gross hematuria or dysuria, due for PSA today    12/27/2022 - patient returns today for follow-up, no issues in the interim, voiding stable, still taking the Flomax and finasteride and working well for, no gross hematuria or dysuria, has not had occasion to use the Cialis recently    09/16/2022 - returns today for follow-up, continues to have left inguinal discomfort radiates down to his testicle, was going to have his hernia repaired but decided against it and canceled the surgery, medications for BPH still working but patient notes that he is tired of taking them, most recent CT shows a 140 g prostate    07/07/2022 - patient returns today for follow-up, notes new onset low back pain/flank pain on the left that radiates down to his left testicle, denies any voiding dysfunction, denies any fevers or gross hematuria, has never had a kidney stone previously    08/12/2021 - patient returns today for follow-up, PSA down by about 1/2 from prior to starting finasteride, voiding well on dual medical therapy, notes some intermittent ED but not bothered, has had 2 prior biopsies, both were negative, no further issues with retention    2/8/21: Voiding a lot better after finasteride, PVR 26ml.  Reviewed history in detail. Discussed prospect of CaP.  8/26/20: Cysto today confirms severe BPH.  CT Urogram shows sig BPH. Prostate is measured at 97 gm, likely a little bigger allowing for technique.  7/29/20: Sims out last week, stream is weak sometimes, others okay.  Macrobid complete.  Hematuria yesterday.  7/23/20: Stopped the flomax and never really started the finasteride and went into retention last week.   Flomax daily since martinez placed in ER.  Also taking the finasteride.  1/8/20: Asks about ED meds; discussed.  Emptying well now better than a year ago. Reviewed history in detail. Not sure he started finasteride.   1/2/19: No sig problems.  Feeling fine.  Had an episode of mild retention and went to the ER where he was given flomax and he voids better on it.  Takes it every 2d or so.  4/12/17: No problems at all.  4/11/16: No PSA done will order today.  Doing well.  Says that for the last four months when he drinks a bunch of beer he gets bilateral flank pain. No hematuria.  3/4/15: PSA essentially unchanged.  Oxybutinin and caffeine cessation have improved OAB but not even needing the oxybutinin lately.  10/10/14: Pt states that after a couple of beers his testicles start to get a dull pain and he also has some SP pressure before voiding.  Then he voids and it is relieved.  Fine during the day.  Really a problem when he drinks too much.  Good stream.  Doesn't hold too long.  Drinks coffee in AM, coke or two during the day, VO/coke,   9/3/14: Pt doing well from cyst excision.  PSA 8.4 on recheck after 14d cipro for PSA 10.  Left testicle the same.  7/9/14: 69 yo man has a dull left testicular pain when he goes to bed but not during most of the day.  Keeps him from sleeping a little bit.  No hematuria.  No urolithiasis history.  Saw Dr. Haskins and another urologist for BPH and elevated PSA.  Had two negative prostate biopsies last 4 years ago and he was advised to have a third and he declined.  No urinary bother.  No constipation.  No hernias or related surgeries.    Allergies:  Eucrisa [crisaborole]    Medications:  has a current medication list which includes the following prescription(s): doxepin, doxycycline, finasteride, halobetasol, levocetirizine, mometasone 0.1%, mupirocin, tamsulosin, pravastatin, and tadalafil.    Review of Systems:  General: No fever, chills  Skin: No rashes  Chest:  Denies cough and sputum  production  Heart: Denies chest pain  Resp: Denies dyspnea  Abdomen: Denies diarrhea, abdominal pain, hematemesis, or blood in stool.  Musculoskeletal: No joint stiffness or swelling. Denies back pain.  : see HPI  Neuro: no dizziness or weakness      PMH:   has a past medical history of Eczema and Elevated PSA.    PSH:   has a past surgical history that includes Appendectomy and left foot surgery.    FamHx: family history includes Cancer in his father.    SocHx:  reports that he has quit smoking. He has never been exposed to tobacco smoke. He has never used smokeless tobacco. He reports current alcohol use. He reports that he does not use drugs.      Physical Exam:  Vitals:    08/30/24 1212   BP: 121/77   Pulse: 73   General: awake, alert, cooperative  Head: NC/AT  Ears: external ears normal  Eyes: sclera normal  Lungs: normal inspiration, NAD  Heart: well-perfused  Abdomen: Soft, NT, ND  : Normal circ'd phallus, meatus normal in size and position, BL testicles palpable, no masses, nontender, no abnormalities of epididymi  Lymphatic: groin nodes negative  Skin: The skin is warm and dry  Ext: No c/c/e.  Neuro: grossly intact, AOx3      Labs/Studies:   Lab Results   Component Value Date    WBC 6.67 08/16/2022    HGB 16.0 08/16/2022    HCT 47.8 08/16/2022     08/16/2022     08/16/2022    K 4.2 08/16/2022     08/16/2022    CREATININE 0.8 08/16/2022    BUN 14 08/16/2022    CO2 25 08/16/2022    PSA 4.9 (H) 02/08/2021    CHOL 165 08/16/2022    TRIG 80 08/16/2022    HDL 33 (L) 08/16/2022    ALT 14 08/16/2022    AST 15 08/16/2022     PSA    7/29/14: 8.4    3/3/15: 9.2    4/16: 10.2    4/17: 9.3    12/19: 12.6    1/20: 11.7     8/21 - 6.0    Bladder Scan performed in office:     10/14: PVR 30 ml.    Impression/Plan:   Left flank pain - no evidence of stones on CT 7/22, pain most likely MSK related    BPH - obtain pelvic ultrasound to establish prostate size, follow-up for cystoscopy    Elevated PSA - 2  prior negative biopsies, would not rebiopsy unless he has a positive MRI, f/u 6 months with PSA    CLAUDIA - Simone Panda MD

## 2024-09-04 ENCOUNTER — TELEPHONE (OUTPATIENT)
Dept: UROLOGY | Facility: CLINIC | Age: 79
End: 2024-09-04
Payer: MEDICARE

## 2024-09-04 NOTE — TELEPHONE ENCOUNTER
Cancelled patient's cysto per patient request.      ----- Message from Mona Mccullough sent at 9/4/2024 10:41 AM CDT -----  Regarding: Procedure  Contact: [phone]  Who call ? PT wife Camelia     What is the request Details : Pt wife  calling to speak with someone in provider office regards canceling procedure on 9/10. States pt tested positive for Covid.        Can clinic  use patient portal  : No    What number to call back : [phone]

## 2025-02-21 DIAGNOSIS — Z00.00 ENCOUNTER FOR MEDICARE ANNUAL WELLNESS EXAM: ICD-10-CM

## 2025-04-04 RX ORDER — FINASTERIDE 5 MG/1
5 TABLET, FILM COATED ORAL
Qty: 90 TABLET | Refills: 3 | Status: SHIPPED | OUTPATIENT
Start: 2025-04-04

## 2025-04-10 ENCOUNTER — TELEPHONE (OUTPATIENT)
Dept: UROLOGY | Facility: CLINIC | Age: 80
End: 2025-04-10
Payer: MEDICARE

## 2025-04-10 NOTE — TELEPHONE ENCOUNTER
tRIED CALLING PT TO SCHEDULE APPT THERE WAS NO ANSWER . MESSAGE SENT TO PT ON PORTAL     ----- Message from Maura sent at 4/10/2025 10:56 AM CDT -----  Contact: Seamus  .Patient is calling to speak with the nurse regarding appt  . Reports needing to schedule appt and needing a refill on medication . Please give patient a call back at .[phone].

## 2025-04-14 DIAGNOSIS — N40.1 BENIGN PROSTATIC HYPERPLASIA WITH WEAK URINARY STREAM: ICD-10-CM

## 2025-04-14 DIAGNOSIS — R39.12 BENIGN PROSTATIC HYPERPLASIA WITH WEAK URINARY STREAM: ICD-10-CM

## 2025-04-15 RX ORDER — TAMSULOSIN HYDROCHLORIDE 0.4 MG/1
1 CAPSULE ORAL
Qty: 90 CAPSULE | Refills: 3 | Status: SHIPPED | OUTPATIENT
Start: 2025-04-15

## 2025-06-23 ENCOUNTER — TELEPHONE (OUTPATIENT)
Dept: UROLOGY | Facility: CLINIC | Age: 80
End: 2025-06-23
Payer: MEDICARE

## 2025-06-23 NOTE — TELEPHONE ENCOUNTER
Called the patient, after verification of name and , the patient was informed  of appt time and date. Offered a sooner appt with Anna,  but the pt said he will wait to see Dr licona. Advised pt to give us a call back if he changes his mind and would like to be soon sooner with Anna. He voiced understanding.

## 2025-07-15 DIAGNOSIS — N40.1 BENIGN PROSTATIC HYPERPLASIA WITH WEAK URINARY STREAM: ICD-10-CM

## 2025-07-15 DIAGNOSIS — R39.12 BENIGN PROSTATIC HYPERPLASIA WITH WEAK URINARY STREAM: ICD-10-CM

## 2025-07-15 NOTE — TELEPHONE ENCOUNTER
Copied from CRM #[id number]. Topic: Medications - Medication Refill  >> Jul 15, 2025  8:06 AM Francisca wrote:  .Type:  RX Refill Request    Who Called: .Seamus Benitez  Refill or New Rx:refill  RX Name and Strength:  tamsulosin (FLOMAX) 0.4 mg Cap  finasteride (PROSCAR) 5 mg tablet  How is the patient currently taking it? (ex. 1XDay):  Is this a 30 day or 90 day RX: 90  Preferred Pharmacy with phone number:.  [facility] [address] [address]  Phone: [phone] Fax: [phone]    Local or Mail Order:local   Ordering Provider:  Would the patient rather a call back or a response via [facility]? Call back  Best Call Back Number:.[phone]  Additional Information: pt has an apt on 7/17 but is out of medication

## 2025-07-17 ENCOUNTER — OFFICE VISIT (OUTPATIENT)
Dept: UROLOGY | Facility: CLINIC | Age: 80
End: 2025-07-17
Payer: MEDICARE

## 2025-07-17 ENCOUNTER — LAB VISIT (OUTPATIENT)
Dept: LAB | Facility: HOSPITAL | Age: 80
End: 2025-07-17
Attending: UROLOGY
Payer: MEDICARE

## 2025-07-17 VITALS
DIASTOLIC BLOOD PRESSURE: 72 MMHG | BODY MASS INDEX: 25.83 KG/M2 | SYSTOLIC BLOOD PRESSURE: 114 MMHG | HEART RATE: 74 BPM | WEIGHT: 164.88 LBS | RESPIRATION RATE: 16 BRPM

## 2025-07-17 DIAGNOSIS — R97.20 ELEVATED PSA: ICD-10-CM

## 2025-07-17 DIAGNOSIS — Z12.5 PROSTATE CANCER SCREENING: Primary | ICD-10-CM

## 2025-07-17 LAB — PSA SERPL-MCNC: 4.04 NG/ML

## 2025-07-17 PROCEDURE — 3288F FALL RISK ASSESSMENT DOCD: CPT | Mod: CPTII,S$GLB,, | Performed by: UROLOGY

## 2025-07-17 PROCEDURE — 84153 ASSAY OF PSA TOTAL: CPT

## 2025-07-17 PROCEDURE — 99214 OFFICE O/P EST MOD 30 MIN: CPT | Mod: S$GLB,,, | Performed by: UROLOGY

## 2025-07-17 PROCEDURE — 1126F AMNT PAIN NOTED NONE PRSNT: CPT | Mod: CPTII,S$GLB,, | Performed by: UROLOGY

## 2025-07-17 PROCEDURE — 1159F MED LIST DOCD IN RCRD: CPT | Mod: CPTII,S$GLB,, | Performed by: UROLOGY

## 2025-07-17 PROCEDURE — 3078F DIAST BP <80 MM HG: CPT | Mod: CPTII,S$GLB,, | Performed by: UROLOGY

## 2025-07-17 PROCEDURE — 3074F SYST BP LT 130 MM HG: CPT | Mod: CPTII,S$GLB,, | Performed by: UROLOGY

## 2025-07-17 PROCEDURE — 99999 PR PBB SHADOW E&M-EST. PATIENT-LVL III: CPT | Mod: PBBFAC,,, | Performed by: UROLOGY

## 2025-07-17 PROCEDURE — 1101F PT FALLS ASSESS-DOCD LE1/YR: CPT | Mod: CPTII,S$GLB,, | Performed by: UROLOGY

## 2025-07-17 PROCEDURE — 36415 COLL VENOUS BLD VENIPUNCTURE: CPT

## 2025-07-17 PROCEDURE — 1160F RVW MEDS BY RX/DR IN RCRD: CPT | Mod: CPTII,S$GLB,, | Performed by: UROLOGY

## 2025-07-17 NOTE — PROGRESS NOTES
Chief Complaint: Elevated PSA    HPI:   07/17/2025 - returns today for follow-up, no new episodes of retention in the interim, stream is good, still considering an outlet procedure, no gross hematuria or dysuria, also notes that his son who is 56 was diagnosed with prostate cancer and had his prostate removed about eight months ago, due for PSA    08/30/2024 - returns today for follow-up, had an episode of retention while he was on a cruise and had to have Sims catheter placed while on the ship, is now considering an outlet procedure    06/27/2023 - patient returns today for follow-up, no new urologic issues in the interim, no gross hematuria or dysuria, due for PSA today    12/27/2022 - patient returns today for follow-up, no issues in the interim, voiding stable, still taking the Flomax and finasteride and working well for, no gross hematuria or dysuria, has not had occasion to use the Cialis recently    09/16/2022 - returns today for follow-up, continues to have left inguinal discomfort radiates down to his testicle, was going to have his hernia repaired but decided against it and canceled the surgery, medications for BPH still working but patient notes that he is tired of taking them, most recent CT shows a 140 g prostate    07/07/2022 - patient returns today for follow-up, notes new onset low back pain/flank pain on the left that radiates down to his left testicle, denies any voiding dysfunction, denies any fevers or gross hematuria, has never had a kidney stone previously    08/12/2021 - patient returns today for follow-up, PSA down by about 1/2 from prior to starting finasteride, voiding well on dual medical therapy, notes some intermittent ED but not bothered, has had 2 prior biopsies, both were negative, no further issues with retention    2/8/21: Voiding a lot better after finasteride, PVR 26ml.  Reviewed history in detail. Discussed prospect of CaP.  8/26/20: Cysto today confirms severe BPH.  CT Urogram  shows sig BPH. Prostate is measured at 97 gm, likely a little bigger allowing for technique.  7/29/20: Martinez out last week, stream is weak sometimes, others okay.  Macrobid complete.  Hematuria yesterday.  7/23/20: Stopped the flomax and never really started the finasteride and went into retention last week.  Flomax daily since martinez placed in ER.  Also taking the finasteride.  1/8/20: Asks about ED meds; discussed.  Emptying well now better than a year ago. Reviewed history in detail. Not sure he started finasteride.   1/2/19: No sig problems.  Feeling fine.  Had an episode of mild retention and went to the ER where he was given flomax and he voids better on it.  Takes it every 2d or so.  4/12/17: No problems at all.  4/11/16: No PSA done will order today.  Doing well.  Says that for the last four months when he drinks a bunch of beer he gets bilateral flank pain. No hematuria.  3/4/15: PSA essentially unchanged.  Oxybutinin and caffeine cessation have improved OAB but not even needing the oxybutinin lately.  10/10/14: Pt states that after a couple of beers his testicles start to get a dull pain and he also has some SP pressure before voiding.  Then he voids and it is relieved.  Fine during the day.  Really a problem when he drinks too much.  Good stream.  Doesn't hold too long.  Drinks coffee in AM, coke or two during the day, VO/coke,   9/3/14: Pt doing well from cyst excision.  PSA 8.4 on recheck after 14d cipro for PSA 10.  Left testicle the same.  7/9/14: 67 yo man has a dull left testicular pain when he goes to bed but not during most of the day.  Keeps him from sleeping a little bit.  No hematuria.  No urolithiasis history.  Saw Dr. Haskins and another urologist for BPH and elevated PSA.  Had two negative prostate biopsies last 4 years ago and he was advised to have a third and he declined.  No urinary bother.  No constipation.  No hernias or related surgeries.    Allergies:  Eucrisa  [crisaborole]    Medications:  has a current medication list which includes the following prescription(s): doxepin, doxycycline, finasteride, halobetasol, levocetirizine, mometasone 0.1%, mupirocin, pravastatin, tadalafil, and tamsulosin.    Review of Systems:  General: No fever, chills  Skin: No rashes  Chest:  Denies cough and sputum production  Heart: Denies chest pain  Resp: Denies dyspnea  Abdomen: Denies diarrhea, abdominal pain, hematemesis, or blood in stool.  Musculoskeletal: No joint stiffness or swelling. Denies back pain.  : see HPI  Neuro: no dizziness or weakness      PMH:   has a past medical history of Eczema and Elevated PSA.    PSH:   has a past surgical history that includes Appendectomy and left foot surgery.    FamHx: family history includes Cancer in his father.    SocHx:  reports that he has quit smoking. He has never been exposed to tobacco smoke. He has never used smokeless tobacco. He reports current alcohol use. He reports that he does not use drugs.      Physical Exam:  Vitals:    07/17/25 1328   BP: 114/72   Pulse: 74   Resp: 16   General: awake, alert, cooperative  Head: NC/AT  Ears: external ears normal  Eyes: sclera normal  Lungs: normal inspiration, NAD  Heart: well-perfused  Abdomen: Soft, NT, ND  : Normal circ'd phallus, meatus normal in size and position, BL testicles palpable, no masses, nontender, no abnormalities of epididymi  Lymphatic: groin nodes negative  Skin: The skin is warm and dry  Ext: No c/c/e.  Neuro: grossly intact, AOx3      Labs/Studies:   Lab Results   Component Value Date    WBC 6.67 08/16/2022    HGB 16.0 08/16/2022    HCT 47.8 08/16/2022     08/16/2022     08/16/2022    K 4.2 08/16/2022     08/16/2022    CREATININE 0.8 08/16/2022    BUN 14 08/16/2022    CO2 25 08/16/2022    PSA 4.9 (H) 02/08/2021    CHOL 165 08/16/2022    TRIG 80 08/16/2022    HDL 33 (L) 08/16/2022    ALT 14 08/16/2022    AST 15 08/16/2022     PSA    7/29/14: 8.4     3/3/15: 9.2    4/16: 10.2    4/17: 9.3    12/19: 12.6    1/20: 11.7     8/21 - 6.0    Bladder Scan performed in office:     10/14: PVR 30 ml.    Impression/Plan:   Left flank pain - no evidence of stones on CT 7/22, pain most likely MSK related    BPH - obtain pelvic ultrasound to establish prostate size, follow-up for cystoscopy    Elevated PSA - 2 prior negative biopsies, would not rebiopsy unless he has a positive MRI, PSA today    CLAUDIA - Simone Panda MD

## 2025-07-18 RX ORDER — TAMSULOSIN HYDROCHLORIDE 0.4 MG/1
1 CAPSULE ORAL DAILY
Qty: 90 CAPSULE | Refills: 3 | Status: SHIPPED | OUTPATIENT
Start: 2025-07-18

## 2025-07-21 ENCOUNTER — HOSPITAL ENCOUNTER (OUTPATIENT)
Dept: RADIOLOGY | Facility: HOSPITAL | Age: 80
Discharge: HOME | End: 2025-07-21
Attending: UROLOGY
Payer: MEDICARE

## 2025-07-21 DIAGNOSIS — R39.12 BENIGN PROSTATIC HYPERPLASIA WITH WEAK URINARY STREAM: ICD-10-CM

## 2025-07-21 DIAGNOSIS — N40.1 BENIGN PROSTATIC HYPERPLASIA WITH WEAK URINARY STREAM: ICD-10-CM

## 2025-07-21 PROCEDURE — 76857 US EXAM PELVIC LIMITED: CPT | Mod: 26,,, | Performed by: RADIOLOGY

## 2025-07-21 PROCEDURE — 76857 US EXAM PELVIC LIMITED: CPT | Mod: TC

## 2025-07-23 ENCOUNTER — PATIENT MESSAGE (OUTPATIENT)
Dept: UROLOGY | Facility: CLINIC | Age: 80
End: 2025-07-23
Payer: MEDICARE

## 2025-07-29 ENCOUNTER — PATIENT MESSAGE (OUTPATIENT)
Dept: UROLOGY | Facility: CLINIC | Age: 80
End: 2025-07-29
Payer: MEDICARE

## 2025-07-29 ENCOUNTER — TELEPHONE (OUTPATIENT)
Dept: UROLOGY | Facility: CLINIC | Age: 80
End: 2025-07-29
Payer: MEDICARE

## 2025-07-29 NOTE — TELEPHONE ENCOUNTER
Attempted to return call to pt as he was requesting to cancel his procedure; no answer.  Left message to call back. Procedure canceled.  Tamar Ferrara LPN  Copied from CRM #[id number]. Topic: General Inquiry - Patient Advice  >> Jul 28, 2025 11:27 AM Abraham wrote:  Type:  Patient Requesting Call    Who Called:Seamus     Does the patient know what this is regarding?:Cancelling Procedure     Would the patient rather a call back or a response via [facility]? Call back     Best Call Back Number:please call back at [phone]    Additional Information:

## 2025-07-30 ENCOUNTER — TELEPHONE (OUTPATIENT)
Dept: UROLOGY | Facility: CLINIC | Age: 80
End: 2025-07-30
Payer: MEDICARE

## 2025-07-30 NOTE — TELEPHONE ENCOUNTER
Rescheduled as requested.    Copied from CRM #[id number]. Topic: General Inquiry - Patient Advice  >> Jul 30, 2025 12:42 PM Estefanía wrote:  Type:  Needs Medical Advice    Who Called: Seamus Benitez   Would the patient rather a call back or a response via [facility]? Call back  Best Call Back Number: [phone]  Additional Information: Need call back regarding procedure.

## 2025-07-30 NOTE — TELEPHONE ENCOUNTER
Rescheduled as requested by the pt.     Copied from CRM #[id number]. Topic: General Inquiry - Patient Advice  >> Jul 30, 2025 11:25 AM Latoya wrote:  Type:  Needs Medical Advice    Who Called: Seamus   Symptoms (please be specific):  cysto procedure   How long has patient had these symptoms:  n/a    Would the patient rather a call back or a response via [facility]? Call back   Best Call Back Number:  [phone] (home)    Additional Information: the caller would like to keep the appointment on 07/31/2025 but would like to have it at a later time.  He does not want the appointment cancel.    Thanks,  SJ

## 2025-08-05 ENCOUNTER — OFFICE VISIT (OUTPATIENT)
Dept: HOME HEALTH SERVICES | Facility: CLINIC | Age: 80
End: 2025-08-05
Payer: MEDICARE

## 2025-08-05 VITALS
SYSTOLIC BLOOD PRESSURE: 124 MMHG | HEART RATE: 63 BPM | WEIGHT: 164 LBS | BODY MASS INDEX: 25.74 KG/M2 | DIASTOLIC BLOOD PRESSURE: 74 MMHG | OXYGEN SATURATION: 98 % | HEIGHT: 67 IN | TEMPERATURE: 98 F

## 2025-08-05 DIAGNOSIS — R97.20 ELEVATED PSA: ICD-10-CM

## 2025-08-05 DIAGNOSIS — F10.90 ALCOHOL USE DISORDER: ICD-10-CM

## 2025-08-05 DIAGNOSIS — N40.1 BENIGN PROSTATIC HYPERPLASIA WITH WEAK URINARY STREAM: ICD-10-CM

## 2025-08-05 DIAGNOSIS — R26.89 BALANCE PROBLEM: ICD-10-CM

## 2025-08-05 DIAGNOSIS — Z00.00 ENCOUNTER FOR MEDICARE ANNUAL WELLNESS EXAM: Primary | ICD-10-CM

## 2025-08-05 DIAGNOSIS — R39.12 BENIGN PROSTATIC HYPERPLASIA WITH WEAK URINARY STREAM: ICD-10-CM

## 2025-08-05 DIAGNOSIS — L98.9 SKIN LESION: ICD-10-CM

## 2025-08-05 PROCEDURE — 3078F DIAST BP <80 MM HG: CPT | Mod: CPTII,S$GLB,, | Performed by: NURSE PRACTITIONER

## 2025-08-05 PROCEDURE — 1158F ADVNC CARE PLAN TLK DOCD: CPT | Mod: CPTII,S$GLB,, | Performed by: NURSE PRACTITIONER

## 2025-08-05 PROCEDURE — 1159F MED LIST DOCD IN RCRD: CPT | Mod: CPTII,S$GLB,, | Performed by: NURSE PRACTITIONER

## 2025-08-05 PROCEDURE — 1170F FXNL STATUS ASSESSED: CPT | Mod: CPTII,S$GLB,, | Performed by: NURSE PRACTITIONER

## 2025-08-05 PROCEDURE — 1101F PT FALLS ASSESS-DOCD LE1/YR: CPT | Mod: CPTII,S$GLB,, | Performed by: NURSE PRACTITIONER

## 2025-08-05 PROCEDURE — 3074F SYST BP LT 130 MM HG: CPT | Mod: CPTII,S$GLB,, | Performed by: NURSE PRACTITIONER

## 2025-08-05 PROCEDURE — G0439 PPPS, SUBSEQ VISIT: HCPCS | Mod: S$GLB,,, | Performed by: NURSE PRACTITIONER

## 2025-08-05 PROCEDURE — 1160F RVW MEDS BY RX/DR IN RCRD: CPT | Mod: CPTII,S$GLB,, | Performed by: NURSE PRACTITIONER

## 2025-08-05 PROCEDURE — 3288F FALL RISK ASSESSMENT DOCD: CPT | Mod: CPTII,S$GLB,, | Performed by: NURSE PRACTITIONER

## 2025-08-05 PROCEDURE — 1126F AMNT PAIN NOTED NONE PRSNT: CPT | Mod: CPTII,S$GLB,, | Performed by: NURSE PRACTITIONER

## 2025-08-05 NOTE — Clinical Note
Medicare awv complete. Health maintenance:  pt declined all vaccines.   2. Balance problem Has balance issue since 1 year or so ago. Occurs while walking. He had a fall 3-4 years ago and hit the back of his head. He did not have any imaging done at that time. Recommend imaging.  Needs annual physical exam along with annual labs with pcp.  Above message sent to pcp team to call pt to schedule.   Mixed hyperlipidemia Not taking statin.  Needs repeat lipid panel. Last done in 2022. F/u with pcp.

## 2025-08-05 NOTE — Clinical Note
7. Alcohol use disorder Drinks 6 shots of liquor per day. 42.0 standard drinks of alcohol per week. Cage score of 2. Recommend to decrease/quit drinking. Pt declined. F/u with pcp.

## 2025-08-05 NOTE — PROGRESS NOTES
"Seamus Benitez presented for an initial Medicare AWV today. The following components were reviewed and updated:    Medical history  Family History  Social history  Allergies and Current Medications  Health Risk Assessment  Health Maintenance  Care Team    **See Completed Assessments for Annual Wellness visit with in the encounter summary    The following assessments were completed:  Depression Screening  Cognitive function Screening    Timed Get Up Test  Whisper Test      Opioid documentation:      Patient does not have a current opioid prescription.          Vitals:    08/05/25 0912   BP: 124/74   Pulse: 63   Temp: 97.9 °F (36.6 °C)   TempSrc: Temporal   SpO2: 98%   Weight: 74.4 kg (164 lb)   Height: 5' 7" (1.702 m)     Body mass index is 25.69 kg/m².       Physical Exam  HENT:      Ears:      Comments: Decreased hearing on the right     Mouth/Throat:      Mouth: Mucous membranes are moist.   Cardiovascular:      Rate and Rhythm: Normal rate and regular rhythm.      Pulses: Normal pulses.      Heart sounds: Normal heart sounds.   Pulmonary:      Effort: Pulmonary effort is normal.      Breath sounds: Normal breath sounds.   Skin:     General: Skin is warm and dry.      Findings: Bruising (right forearm) and lesion (right ankle) present.   Neurological:      General: No focal deficit present.      Mental Status: He is alert and oriented to person, place, and time.   Psychiatric:         Mood and Affect: Mood normal.         Behavior: Behavior normal.     Dermatology treating skin lesion right ankle.       Diagnoses and health risks identified today and associated recommendations/orders:  1. Encounter for Medicare annual wellness exam  Medicare awv complete. Health maintenance:  pt declined all vaccines.   - Referral to Enhanced Annual Wellness Visit (eAWV) W+1    2. Balance problem  Has balance issue since 1 year or so ago. Occurs while walking. He had a fall 3-4 years ago and hit the back of his head. He did not " have any imaging done at that time. Recommend imaging.   Needs annual physical exam along with annual labs with pcp.   Above message sent to pcp team to call pt to schedule.     3. Elevated PSA  Lab Results   Component Value Date    PSA 4.04 (H) 07/17/2025    PSA 4.9 (H) 02/08/2021    PSA 11.7 (H) 01/08/2020    Labs stable.  Continue routine monitoring. 2 prior negative biopsies. Cystoscope soon. Managed by urology.     4. Skin lesion  Right ankle. Does not look infected but not quite healed. Follow up with dermatology.     5. Benign prostatic hyperplasia with weak urinary stream  Symptoms stable. Continue  proscar and flomax. Follow up with pcp.      6. Mixed hyperlipidemia  Not taking statin.  Needs repeat lipid panel. Last done in 2022. F/u with pcp.     7. Alcohol use disorder  Drinks 6 shots of liquor per day. 42.0 standard drinks of alcohol per week. Cage score of 2. Recommend to decrease/quit drinking. Pt declined. F/u with pcp.     Provided Seamus with a 5-10 year written screening schedule and personal prevention plan. Recommendations were developed using the USPSTF age appropriate recommendations. Education, counseling, and referrals were provided as needed.  After Visit Summary printed and given to patient which includes a list of additional screenings\tests needed.    Follow up in about 1 year (around 8/5/2026) for annual wellness visit.      ELMA Castillo      I offered to discuss advanced care planning, including how to pick a person who would make decisions for you if you were unable to make them for yourself, called a health care power of , and what kind of decisions you might make such as use of life sustaining treatments such as ventilators and tube feeding when faced with a life limiting illness recorded on a living will that they will need to know. (How you want to be cared for as you near the end of your natural life)     X Patient is interested in learning more about how to  make advanced directives.  I provided them paperwork and offered to discuss this with them.

## 2025-08-05 NOTE — PATIENT INSTRUCTIONS
Counseling and Referral of Other Preventative  (Italic type indicates deductible and co-insurance are waived)    Patient Name: Seamus Benitez  Today's Date: 8/5/2025    Health Maintenance       Date Due Completion Date    RSV Vaccine (Age 60+ and Pregnant patients) (1 - 1-dose 75+ series) 08/05/2025 (Originally 7/11/2021) ---    Shingles Vaccine (1 of 2) 08/05/2026 (Originally 7/11/1996) ---    COVID-19 Vaccine (3 - 2024-25 season) 08/05/2026 (Originally 9/1/2024) 4/16/2021    Pneumococcal Vaccines (Age 50+) (2 of 2 - PCV) 08/05/2026 (Originally 4/30/2016) 4/30/2015    Influenza Vaccine (1) 09/01/2025 ---    Lipid Panel 08/16/2027 8/16/2022    TETANUS VACCINE 07/31/2029 7/31/2019        No orders of the defined types were placed in this encounter.      The following information is provided to all patients.  This information is to help you find resources for any of the problems found today that may be affecting your health:                  Living healthy guide: www.[facility].[place].gov      Understanding Diabetes: www.diabetes.org      Eating healthy: www.cdc.gov/healthyweight      CDC home safety checklist: www.cdc.gov/steadi/patient.html      Agency on Aging: www.goea.[place].[facility]      Alcoholics anonymous (AA): www.aa.org      Physical Activity: www.tyler.nih.gov/zf7kpoz      Tobacco use: www.quitwithusla.org

## 2025-08-06 ENCOUNTER — PATIENT MESSAGE (OUTPATIENT)
Dept: INTERNAL MEDICINE | Facility: CLINIC | Age: 80
End: 2025-08-06
Payer: MEDICARE

## 2025-08-19 ENCOUNTER — OFFICE VISIT (OUTPATIENT)
Dept: INTERNAL MEDICINE | Facility: CLINIC | Age: 80
End: 2025-08-19
Payer: MEDICARE

## 2025-08-19 VITALS
SYSTOLIC BLOOD PRESSURE: 108 MMHG | TEMPERATURE: 98 F | HEART RATE: 76 BPM | DIASTOLIC BLOOD PRESSURE: 60 MMHG | WEIGHT: 158.31 LBS | BODY MASS INDEX: 24.85 KG/M2 | HEIGHT: 67 IN | OXYGEN SATURATION: 98 %

## 2025-08-19 DIAGNOSIS — R26.0 ATAXIC GAIT: Primary | Chronic | ICD-10-CM

## 2025-08-19 DIAGNOSIS — F10.20 UNCOMPLICATED ALCOHOL DEPENDENCE: Chronic | ICD-10-CM

## 2025-08-19 DIAGNOSIS — R17 ELEVATED BILIRUBIN: ICD-10-CM

## 2025-08-19 DIAGNOSIS — H61.22 IMPACTED CERUMEN OF LEFT EAR: ICD-10-CM

## 2025-08-19 DIAGNOSIS — R94.31 ABNORMAL EKG: ICD-10-CM

## 2025-08-19 DIAGNOSIS — E44.1 MILD PROTEIN MALNUTRITION: Chronic | ICD-10-CM

## 2025-08-19 DIAGNOSIS — E78.2 MIXED HYPERLIPIDEMIA: Chronic | ICD-10-CM

## 2025-08-19 DIAGNOSIS — R32 URINARY INCONTINENCE, UNSPECIFIED TYPE: Chronic | ICD-10-CM

## 2025-08-19 PROBLEM — R26.89 IMPAIRMENT OF BALANCE: Chronic | Status: ACTIVE | Noted: 2025-08-05

## 2025-08-19 PROBLEM — F10.90 ALCOHOL USE DISORDER: Chronic | Status: ACTIVE | Noted: 2025-08-05

## 2025-08-19 LAB
ALBUMIN SERPL BCP-MCNC: 4 G/DL (ref 3.5–5.2)
ALP SERPL-CCNC: 75 UNIT/L (ref 40–150)
ALT SERPL W/O P-5'-P-CCNC: 21 UNIT/L (ref 0–55)
ANION GAP (OHS): 12 MMOL/L (ref 8–16)
AST SERPL-CCNC: 22 UNIT/L (ref 0–50)
BILIRUB DIRECT SERPL-MCNC: 0.5 MG/DL (ref 0.1–0.3)
BILIRUB SERPL-MCNC: 1.3 MG/DL (ref 0.1–1)
BUN SERPL-MCNC: 16 MG/DL (ref 8–23)
CALCIUM SERPL-MCNC: 9.5 MG/DL (ref 8.7–10.5)
CHLORIDE SERPL-SCNC: 103 MMOL/L (ref 95–110)
CHOLEST SERPL-MCNC: 141 MG/DL (ref 120–199)
CHOLEST/HDLC SERPL: 4 {RATIO} (ref 2–5)
CO2 SERPL-SCNC: 22 MMOL/L (ref 23–29)
CREAT SERPL-MCNC: 0.8 MG/DL (ref 0.5–1.4)
ERYTHROCYTE [DISTWIDTH] IN BLOOD BY AUTOMATED COUNT: 12.7 % (ref 11.5–14.5)
GFR SERPLBLD CREATININE-BSD FMLA CKD-EPI: >60 ML/MIN/1.73/M2
GGT SERPL-CCNC: 27 U/L (ref 8–55)
GLUCOSE SERPL-MCNC: 81 MG/DL (ref 70–110)
HCT VFR BLD AUTO: 44.7 % (ref 40–54)
HDLC SERPL-MCNC: 35 MG/DL (ref 40–75)
HDLC SERPL: 24.8 % (ref 20–50)
HGB BLD-MCNC: 14.9 GM/DL (ref 14–18)
LDLC SERPL CALC-MCNC: 95 MG/DL (ref 63–159)
MCH RBC QN AUTO: 31.6 PG (ref 27–31)
MCHC RBC AUTO-ENTMCNC: 33.3 G/DL (ref 32–36)
MCV RBC AUTO: 95 FL (ref 82–98)
NONHDLC SERPL-MCNC: 106 MG/DL
PLATELET # BLD AUTO: 295 K/UL (ref 150–450)
PMV BLD AUTO: 10.9 FL (ref 9.2–12.9)
POTASSIUM SERPL-SCNC: 4.4 MMOL/L (ref 3.5–5.1)
PREALB SERPL-MCNC: 19 MG/DL (ref 20–43)
PROT SERPL-MCNC: 6.9 GM/DL (ref 6–8.4)
RBC # BLD AUTO: 4.71 M/UL (ref 4.6–6.2)
SODIUM SERPL-SCNC: 137 MMOL/L (ref 136–145)
TRIGL SERPL-MCNC: 55 MG/DL (ref 30–150)
TSH SERPL-ACNC: 2.1 UIU/ML (ref 0.4–4)
WBC # BLD AUTO: 6.65 K/UL (ref 3.9–12.7)

## 2025-08-19 PROCEDURE — 85027 COMPLETE CBC AUTOMATED: CPT | Performed by: FAMILY MEDICINE

## 2025-08-19 PROCEDURE — 82248 BILIRUBIN DIRECT: CPT | Performed by: FAMILY MEDICINE

## 2025-08-19 PROCEDURE — 84443 ASSAY THYROID STIM HORMONE: CPT | Performed by: FAMILY MEDICINE

## 2025-08-19 PROCEDURE — 80321 ALCOHOLS BIOMARKERS 1OR 2: CPT | Performed by: FAMILY MEDICINE

## 2025-08-19 PROCEDURE — 80053 COMPREHEN METABOLIC PANEL: CPT | Performed by: FAMILY MEDICINE

## 2025-08-19 PROCEDURE — 3288F FALL RISK ASSESSMENT DOCD: CPT | Mod: CPTII,S$GLB,, | Performed by: FAMILY MEDICINE

## 2025-08-19 PROCEDURE — 84425 ASSAY OF VITAMIN B-1: CPT | Performed by: FAMILY MEDICINE

## 2025-08-19 PROCEDURE — 3078F DIAST BP <80 MM HG: CPT | Mod: CPTII,S$GLB,, | Performed by: FAMILY MEDICINE

## 2025-08-19 PROCEDURE — 84134 ASSAY OF PREALBUMIN: CPT | Performed by: FAMILY MEDICINE

## 2025-08-19 PROCEDURE — 99999 PR PBB SHADOW E&M-EST. PATIENT-LVL III: CPT | Mod: PBBFAC,,, | Performed by: FAMILY MEDICINE

## 2025-08-19 PROCEDURE — 1101F PT FALLS ASSESS-DOCD LE1/YR: CPT | Mod: CPTII,S$GLB,, | Performed by: FAMILY MEDICINE

## 2025-08-19 PROCEDURE — 82607 VITAMIN B-12: CPT | Performed by: FAMILY MEDICINE

## 2025-08-19 PROCEDURE — 1126F AMNT PAIN NOTED NONE PRSNT: CPT | Mod: CPTII,S$GLB,, | Performed by: FAMILY MEDICINE

## 2025-08-19 PROCEDURE — 82977 ASSAY OF GGT: CPT | Performed by: FAMILY MEDICINE

## 2025-08-19 PROCEDURE — 82746 ASSAY OF FOLIC ACID SERUM: CPT | Performed by: FAMILY MEDICINE

## 2025-08-19 PROCEDURE — 3074F SYST BP LT 130 MM HG: CPT | Mod: CPTII,S$GLB,, | Performed by: FAMILY MEDICINE

## 2025-08-19 PROCEDURE — 99204 OFFICE O/P NEW MOD 45 MIN: CPT | Mod: S$GLB,,, | Performed by: FAMILY MEDICINE

## 2025-08-19 PROCEDURE — G2211 COMPLEX E/M VISIT ADD ON: HCPCS | Mod: S$GLB,,, | Performed by: FAMILY MEDICINE

## 2025-08-19 PROCEDURE — 80061 LIPID PANEL: CPT | Performed by: FAMILY MEDICINE

## 2025-08-20 LAB
FOLATE SERPL-MCNC: 7.9 NG/ML (ref 4–24)
VIT B12 SERPL-MCNC: 209 PG/ML (ref 210–950)

## 2025-08-21 ENCOUNTER — PROCEDURE VISIT (OUTPATIENT)
Dept: UROLOGY | Facility: CLINIC | Age: 80
End: 2025-08-21
Payer: MEDICARE

## 2025-08-21 DIAGNOSIS — N40.1 BENIGN PROSTATIC HYPERPLASIA WITH WEAK URINARY STREAM: Primary | ICD-10-CM

## 2025-08-21 DIAGNOSIS — R39.12 BENIGN PROSTATIC HYPERPLASIA WITH WEAK URINARY STREAM: Primary | ICD-10-CM

## 2025-08-21 PROCEDURE — 99214 OFFICE O/P EST MOD 30 MIN: CPT | Mod: 25,S$GLB,, | Performed by: UROLOGY

## 2025-08-21 PROCEDURE — 52000 CYSTOURETHROSCOPY: CPT | Mod: S$GLB,,, | Performed by: UROLOGY

## 2025-08-21 RX ORDER — LIDOCAINE HYDROCHLORIDE 20 MG/ML
JELLY TOPICAL
Status: CANCELLED | OUTPATIENT
Start: 2025-08-21 | End: 2025-08-21

## 2025-08-21 RX ORDER — LIDOCAINE HYDROCHLORIDE 20 MG/ML
JELLY TOPICAL
Status: COMPLETED | OUTPATIENT
Start: 2025-08-21 | End: 2025-08-21

## 2025-08-21 RX ADMIN — LIDOCAINE HYDROCHLORIDE 11 ML: 20 JELLY TOPICAL at 01:08

## 2025-08-22 LAB
PLPETH BLD-MCNC: 131 NG/ML
POPETH BLD-MCNC: 120 NG/ML
TOXICOLOGIST REVIEW: NORMAL

## 2025-08-25 LAB — W VITAMIN B1: 56 UG/L
